# Patient Record
Sex: FEMALE | Race: BLACK OR AFRICAN AMERICAN | NOT HISPANIC OR LATINO | Employment: OTHER | URBAN - METROPOLITAN AREA
[De-identification: names, ages, dates, MRNs, and addresses within clinical notes are randomized per-mention and may not be internally consistent; named-entity substitution may affect disease eponyms.]

---

## 2019-11-16 ENCOUNTER — HOSPITAL ENCOUNTER (EMERGENCY)
Facility: HOSPITAL | Age: 59
Discharge: HOME | End: 2019-11-16
Attending: EMERGENCY MEDICINE
Payer: MEDICARE

## 2019-11-16 VITALS
OXYGEN SATURATION: 99 % | WEIGHT: 235 LBS | DIASTOLIC BLOOD PRESSURE: 86 MMHG | HEART RATE: 95 BPM | RESPIRATION RATE: 16 BRPM | SYSTOLIC BLOOD PRESSURE: 156 MMHG | TEMPERATURE: 97 F

## 2019-11-16 DIAGNOSIS — K92.2 LOWER GI BLEED: Primary | ICD-10-CM

## 2019-11-16 DIAGNOSIS — K64.9 HEMORRHOIDS, UNSPECIFIED HEMORRHOID TYPE: ICD-10-CM

## 2019-11-16 LAB
ALBUMIN SERPL-MCNC: 4.2 G/DL (ref 3.3–5.5)
ALP SERPL-CCNC: 100 U/L (ref 42–141)
BILIRUB SERPL-MCNC: 0.4 MG/DL (ref 0.2–1.6)
BUN SERPL-MCNC: 12 MG/DL (ref 7–22)
CALCIUM SERPL-MCNC: 9.9 MG/DL (ref 8–10.3)
CHLORIDE SERPL-SCNC: 105 MMOL/L (ref 98–108)
CREAT SERPL-MCNC: 0.8 MG/DL (ref 0.6–1.2)
GLUCOSE SERPL-MCNC: 102 MG/DL (ref 73–118)
POC ALT (SGPT): 21 U/L (ref 10–47)
POC AST (SGOT): 31 U/L (ref 11–38)
POC PTINR: 1.1 (ref 0.9–1.2)
POC PTWBT: 12.8 SEC (ref 9.7–14.3)
POC TCO2: 28 MMOL/L (ref 18–33)
POTASSIUM BLD-SCNC: 3.7 MMOL/L (ref 3.6–5.1)
PROTEIN, POC: 8.1 G/DL (ref 6.4–8.1)
SAMPLE: NORMAL
SODIUM BLD-SCNC: 144 MMOL/L (ref 128–145)

## 2019-11-16 PROCEDURE — 25500020 PHARM REV CODE 255: Mod: ER | Performed by: EMERGENCY MEDICINE

## 2019-11-16 PROCEDURE — 96374 THER/PROPH/DIAG INJ IV PUSH: CPT | Mod: ER,59

## 2019-11-16 PROCEDURE — 63600175 PHARM REV CODE 636 W HCPCS: Mod: ER | Performed by: EMERGENCY MEDICINE

## 2019-11-16 PROCEDURE — 85025 COMPLETE CBC W/AUTO DIFF WBC: CPT | Mod: ER

## 2019-11-16 PROCEDURE — 25000003 PHARM REV CODE 250: Mod: ER | Performed by: EMERGENCY MEDICINE

## 2019-11-16 PROCEDURE — 80053 COMPREHEN METABOLIC PANEL: CPT | Mod: ER

## 2019-11-16 PROCEDURE — 96375 TX/PRO/DX INJ NEW DRUG ADDON: CPT | Mod: ER

## 2019-11-16 PROCEDURE — 85610 PROTHROMBIN TIME: CPT | Mod: ER

## 2019-11-16 PROCEDURE — 99284 EMERGENCY DEPT VISIT MOD MDM: CPT | Mod: 25,ER

## 2019-11-16 RX ORDER — ONDANSETRON 2 MG/ML
4 INJECTION INTRAMUSCULAR; INTRAVENOUS
Status: COMPLETED | OUTPATIENT
Start: 2019-11-16 | End: 2019-11-16

## 2019-11-16 RX ORDER — CIPROFLOXACIN 500 MG/1
500 TABLET ORAL 2 TIMES DAILY
Qty: 20 TABLET | Refills: 0 | Status: SHIPPED | OUTPATIENT
Start: 2019-11-16 | End: 2019-11-26

## 2019-11-16 RX ORDER — METRONIDAZOLE 500 MG/1
500 TABLET ORAL
Status: COMPLETED | OUTPATIENT
Start: 2019-11-16 | End: 2019-11-16

## 2019-11-16 RX ORDER — CIPROFLOXACIN 500 MG/1
500 TABLET ORAL
Status: COMPLETED | OUTPATIENT
Start: 2019-11-16 | End: 2019-11-16

## 2019-11-16 RX ORDER — HYDROMORPHONE HYDROCHLORIDE 2 MG/ML
0.5 INJECTION, SOLUTION INTRAMUSCULAR; INTRAVENOUS; SUBCUTANEOUS
Status: COMPLETED | OUTPATIENT
Start: 2019-11-16 | End: 2019-11-16

## 2019-11-16 RX ORDER — METRONIDAZOLE 500 MG/1
500 TABLET ORAL EVERY 12 HOURS
Qty: 14 TABLET | Refills: 0 | Status: SHIPPED | OUTPATIENT
Start: 2019-11-16 | End: 2019-11-26

## 2019-11-16 RX ADMIN — IOHEXOL 100 ML: 350 INJECTION, SOLUTION INTRAVENOUS at 07:11

## 2019-11-16 RX ADMIN — ONDANSETRON 4 MG: 2 INJECTION INTRAMUSCULAR; INTRAVENOUS at 06:11

## 2019-11-16 RX ADMIN — HYDROMORPHONE HYDROCHLORIDE 0.5 MG: 2 INJECTION, SOLUTION INTRAMUSCULAR; INTRAVENOUS; SUBCUTANEOUS at 06:11

## 2019-11-16 RX ADMIN — METRONIDAZOLE 500 MG: 500 TABLET ORAL at 08:11

## 2019-11-16 RX ADMIN — CIPROFLOXACIN HYDROCHLORIDE 500 MG: 500 TABLET, FILM COATED ORAL at 08:11

## 2019-11-16 NOTE — ED PROVIDER NOTES
"Encounter Date: 2019    SCRIBE #1 NOTE: I, Analisa Turner, am scribing for, and in the presence of,  Dr. Bryant. I have scribed the following portions of the note - Other sections scribed: HPI, ROS, PE.       History     Chief Complaint   Patient presents with    Rectal Bleeding     Pt states," I set down and the toilet was full of dark blood from my bottom. "     Chad Nguyen is a 59 y.o. Female with significant past medical history of diverticulosis and gastric ulcers who presents to the ED complaining of dark red rectal bleeding with back pain and RLQ pain x 3 weeks. Patient describes the bleeding as clots. She is not on blood thinners.    The history is provided by the patient. No  was used.     Review of patient's allergies indicates:   Allergen Reactions    Clindamycin Shortness Of Breath     And face swelling    Lisinopril Anaphylaxis    Promethazine Other (See Comments)     "makes me crazy"     Past Medical History:   Diagnosis Date    Cancer     thyroid    Diabetes mellitus     Hypertension      Past Surgical History:   Procedure Laterality Date     SECTION      CHOLECYSTECTOMY      HYSTERECTOMY      TONSILLECTOMY       History reviewed. No pertinent family history.  Social History     Tobacco Use    Smoking status: Never Smoker    Smokeless tobacco: Never Used   Substance Use Topics    Alcohol use: Not on file    Drug use: Not on file     Review of Systems   Constitutional: Negative.  Negative for fever.   HENT: Negative.  Negative for sore throat.    Eyes: Negative.    Respiratory: Negative.  Negative for shortness of breath.    Cardiovascular: Negative.  Negative for chest pain.   Gastrointestinal: Positive for abdominal pain and anal bleeding. Negative for nausea and vomiting.   Endocrine: Negative.    Genitourinary: Negative.  Negative for dysuria.   Musculoskeletal: Positive for back pain. Negative for myalgias.   Skin: Negative.  Negative for rash. "   Allergic/Immunologic: Negative.    Neurological: Negative.  Negative for headaches.   Hematological: Negative.  Negative for adenopathy. Does not bruise/bleed easily.   Psychiatric/Behavioral: Negative.  Negative for behavioral problems.   All other systems reviewed and are negative.      Physical Exam     Initial Vitals [11/16/19 1741]   BP Pulse Resp Temp SpO2   (!) 171/101 104 16 99 °F (37.2 °C) 100 %      MAP       --         Physical Exam    Nursing note and vitals reviewed.  Constitutional: She appears well-developed and well-nourished.   HENT:   Head: Normocephalic and atraumatic.   Right Ear: External ear normal.   Left Ear: External ear normal.   Nose: Nose normal.   Eyes: Conjunctivae are normal.   Neck: Normal range of motion. Neck supple.   Cardiovascular: Normal rate and intact distal pulses.   Pulmonary/Chest: Effort normal. No respiratory distress.   Abdominal: Soft. There is no tenderness.   Genitourinary: Rectal exam shows external hemorrhoid.   Genitourinary Comments: Non-thrombosed bleeding hemorrhoids protruding from rectum.   Musculoskeletal: Normal range of motion.   Neurological: She is alert and oriented to person, place, and time.   Skin: Skin is warm and dry. Capillary refill takes less than 2 seconds.   Psychiatric: She has a normal mood and affect. Her behavior is normal.         ED Course   Procedures  Labs Reviewed   POCT CBC   POCT CMP   POCT PROTIME-INR   ISTAT PROCEDURE   POCT CMP          Imaging Results          CT Abdomen Pelvis With Contrast (Final result)  Result time 11/16/19 19:09:06    Final result by Trevon Manzo MD (11/16/19 19:09:06)                 Impression:      1. Findings suggesting hepatic steatosis, correlation with LFTs recommended.  2. Colonic diverticulosis without findings to suggest diverticulitis.  3. Subcentimeter low attenuating lesions within the kidneys, too small for characterization.  Nonemergent ultrasound could be performed as warranted to  confirm cystic nature.  4. No discrete CT abnormality to correlate with left lower quadrant pain, no findings to suggest left lower quadrant diverticulitis as clinically questioned.  5. Additional findings above.      Electronically signed by: Trevon Manzo MD  Date:    11/16/2019  Time:    19:09             Narrative:    EXAMINATION:  CT ABDOMEN PELVIS WITH CONTRAST    CLINICAL HISTORY:  LLQ pain, suspect diverticulitis;    TECHNIQUE:  Low dose axial images, sagittal and coronal reformations were obtained from the lung bases to the pubic symphysis following the IV administration of 100 mL of Omnipaque 350 .  Oral contrast was not given.    COMPARISON:  None.    FINDINGS:  Images of the lower thorax are remarkable for mild bilateral dependent atelectasis.    The liver is hypoattenuating, suggesting steatosis, correlation with LFTs recommended.  The spleen, pancreas and adrenal glands are grossly unremarkable.  The gallbladder is surgically absent.  There is no biliary dilation or ascites.  The pancreatic duct is not dilated.  The portal vein, splenic vein, SMV, celiac axis and SMA all are grossly patent.  No significant abdominal lymphadenopathy.    The kidneys enhance symmetrically without hydronephrosis or nephrolithiasis.  There are a few scattered subcentimeter low attenuating lesions arising from the kidneys, too small for characterization.  There is a subcentimeter high attenuating lesion arising from the interpolar region of the right kidney, also too small for characterization.  The bilateral ureters are unremarkable without calculi seen.  The urinary bladder is unremarkable the uterus is absent the adnexa is unremarkable.    There are a few scattered colonic diverticula without surrounding inflammation to suggest diverticulitis.  The terminal ileum and appendix are unremarkable.  The small bowel is grossly unremarkable.  There are a few scattered shotty periaortic and paracaval lymph nodes.  No focal  "organized pelvic fluid collection.    Degenerative changes are noted of the spine and sacroiliac joints.  No significant inguinal lymphadenopathy.                                 Medical Decision Making:   History:   Old Medical Records: I decided to obtain old medical records.  Clinical Tests:   Lab Tests: Ordered and Reviewed  ED Management:  This patient's care was turned over to Dr. Mi Velasco at 5:00 p.m. pending CT scan.            Scribe Attestation:   Scribe #1: I performed the above scribed service and the documentation accurately describes the services I performed. I attest to the accuracy of the note.    Transition/[place] of care note:  I have communicated the patient's history and progression in the ED with Dr. Bryant  Brief H&P: Patient is a 59 y.o. female who presented to the ED with Rectal Bleeding (Pt states," I set down and the toilet was full of dark blood from my bottom. ")    Significant history and PE findings: rectal bleeding  DDx: diverticulitis, LGIB, anemia, coagulopathy, others  Consults/Referrals: NA   Significant lab and diagnostic findings: see below  Pending studies and consultations: CTAP  Disposition:  Will continue to monitor while final disposition is pending and manage patient expectations for the duration of stay in ED.  Mi Velasco MD, Emergency Medicine Staff 7:02 PM 11/16/2019          Labs Reviewed  Admission on 11/16/2019, Discharged on 11/16/2019   Component Date Value Ref Range Status    POC PTWBT 11/16/2019 12.8  9.7 - 14.3 sec Final    POC PTINR 11/16/2019 1.1  0.9 - 1.2 Final    Sample 11/16/2019 UNK   Final    Albumin, POC 11/16/2019 4.2  3.3 - 5.5 g/dL Final    Alkaline Phosphatase, POC 11/16/2019 100  42 - 141 U/L Final    ALT (SGPT), POC 11/16/2019 21  10 - 47 U/L Final    AST (SGOT), POC 11/16/2019 31  11 - 38 U/L Final    POC BUN 11/16/2019 12  7 - 22 mg/dL Final    Calcium, POC 11/16/2019 9.9  8 - 10.3 mg/dL Final    POC Chloride " 11/16/2019 105  98 - 108 mmol/L Final    POC Creatinine 11/16/2019 0.8  0.6 - 1.2 mg/dL Final    POC Glucose 11/16/2019 102  73 - 118 mg/dL Final    POC Potassium 11/16/2019 3.7  3.6 - 5.1 mmol/L Final    POC Sodium 11/16/2019 144  128 - 145 mmol/L Final    Bilirubin 11/16/2019 0.4  0.2 - 1.6 mg/dL Final    POC TCO2 11/16/2019 28  18 - 33 mmol/L Final    Protein 11/16/2019 8.1  6.4 - 8.1 g/dL Final        Imaging Reviewed    Imaging Results          CT Abdomen Pelvis With Contrast (Final result)  Result time 11/16/19 19:09:06    Final result by Trevon Manzo MD (11/16/19 19:09:06)                 Impression:      1. Findings suggesting hepatic steatosis, correlation with LFTs recommended.  2. Colonic diverticulosis without findings to suggest diverticulitis.  3. Subcentimeter low attenuating lesions within the kidneys, too small for characterization.  Nonemergent ultrasound could be performed as warranted to confirm cystic nature.  4. No discrete CT abnormality to correlate with left lower quadrant pain, no findings to suggest left lower quadrant diverticulitis as clinically questioned.  5. Additional findings above.      Electronically signed by: Trevon Manzo MD  Date:    11/16/2019  Time:    19:09             Narrative:    EXAMINATION:  CT ABDOMEN PELVIS WITH CONTRAST    CLINICAL HISTORY:  LLQ pain, suspect diverticulitis;    TECHNIQUE:  Low dose axial images, sagittal and coronal reformations were obtained from the lung bases to the pubic symphysis following the IV administration of 100 mL of Omnipaque 350 .  Oral contrast was not given.    COMPARISON:  None.    FINDINGS:  Images of the lower thorax are remarkable for mild bilateral dependent atelectasis.    The liver is hypoattenuating, suggesting steatosis, correlation with LFTs recommended.  The spleen, pancreas and adrenal glands are grossly unremarkable.  The gallbladder is surgically absent.  There is no biliary dilation or ascites.  The  pancreatic duct is not dilated.  The portal vein, splenic vein, SMV, celiac axis and SMA all are grossly patent.  No significant abdominal lymphadenopathy.    The kidneys enhance symmetrically without hydronephrosis or nephrolithiasis.  There are a few scattered subcentimeter low attenuating lesions arising from the kidneys, too small for characterization.  There is a subcentimeter high attenuating lesion arising from the interpolar region of the right kidney, also too small for characterization.  The bilateral ureters are unremarkable without calculi seen.  The urinary bladder is unremarkable the uterus is absent the adnexa is unremarkable.    There are a few scattered colonic diverticula without surrounding inflammation to suggest diverticulitis.  The terminal ileum and appendix are unremarkable.  The small bowel is grossly unremarkable.  There are a few scattered shotty periaortic and paracaval lymph nodes.  No focal organized pelvic fluid collection.    Degenerative changes are noted of the spine and sacroiliac joints.  No significant inguinal lymphadenopathy.                                Medications given in ED    Medications   hydromorphone (PF) injection 0.5 mg (0.5 mg Intravenous Given 11/16/19 1843)   ondansetron injection 4 mg (4 mg Intravenous Given 11/16/19 1843)   iohexol (OMNIPAQUE 350) injection 100 mL (100 mLs Intravenous Given 11/16/19 1900)   ciprofloxacin HCl tablet 500 mg (500 mg Oral Given 11/16/19 2031)   metroNIDAZOLE tablet 500 mg (500 mg Oral Given 11/16/19 2031)       This document was produced by a scribe under my direction and in my presence. I agree with the content of the note and have made any necessary edits.     Mi Velasco MD         Note was created using voice recognition software. Note may have occasional typographical errors that may not have been identified and edited despite good hugo initial review prior to signing.          ED Course as of Nov 17 0403   Sat Nov 16,  2019 2009 No recurrent bleeding during ED course.     [DL]      ED Course User Index  [DL] Mi Velasco MD           Discharge Medications     Discharge Medication List as of 11/16/2019  8:35 PM      START taking these medications    Details   ciprofloxacin HCl (CIPRO) 500 MG tablet Take 1 tablet (500 mg total) by mouth 2 (two) times daily. for 10 days, Starting Sat 11/16/2019, Until Tue 11/26/2019, Normal      hydrocortisone-pramoxine (PROCTOFOAM-HS) rectal foam Place 1 applicator rectally 2 (two) times daily., Starting Sat 11/16/2019, Normal      metroNIDAZOLE (FLAGYL) 500 MG tablet Take 1 tablet (500 mg total) by mouth every 12 (twelve) hours. DO NOT DRINK ALCOHOL WHILE TAKING THIS MEDICATION for 10 days, Starting Sat 11/16/2019, Until Tue 11/26/2019, Normal                   Patient discharged to home in stable condition with instructions to:   1. Please take all meds as prescribed.  2. Follow-up with your primary care doctor   3. Return precautions discussed and patient and/or family/caretaker understands to return to the emergency room for any concerns including worsening of your current symptoms, fever, chills, night sweats, worsening pain, chest pain, shortness of breath, nausea, vomiting, diarrhea, bleeding, headache, difficulty talking, visual disturbances, weakness, numbness or any other acute concerns          Clinical Impression:     1. Lower GI bleed    2. Hemorrhoids, unspecified hemorrhoid type            Disposition:   Disposition: Discharged  Condition: Stable                     Mi Velasco MD  11/17/19 0404

## 2020-02-22 ENCOUNTER — HOSPITAL ENCOUNTER (OUTPATIENT)
Facility: HOSPITAL | Age: 60
Discharge: HOME | End: 2020-02-23
Attending: EMERGENCY MEDICINE | Admitting: HOSPITALIST
Payer: MEDICARE

## 2020-02-22 DIAGNOSIS — R06.02 SOB (SHORTNESS OF BREATH): ICD-10-CM

## 2020-02-22 DIAGNOSIS — R07.9 CHEST PAIN: ICD-10-CM

## 2020-02-22 PROBLEM — Z79.4 TYPE 2 DIABETES MELLITUS, WITH LONG-TERM CURRENT USE OF INSULIN: Chronic | Status: ACTIVE | Noted: 2020-02-22

## 2020-02-22 PROBLEM — E11.9 TYPE 2 DIABETES MELLITUS, WITHOUT LONG-TERM CURRENT USE OF INSULIN: Chronic | Status: ACTIVE | Noted: 2020-02-22

## 2020-02-22 PROBLEM — I10 ESSENTIAL HYPERTENSION: Chronic | Status: ACTIVE | Noted: 2020-02-22

## 2020-02-22 PROBLEM — E78.2 MIXED HYPERLIPIDEMIA: Chronic | Status: ACTIVE | Noted: 2020-02-22

## 2020-02-22 LAB
ALBUMIN SERPL-MCNC: 4.6 G/DL (ref 3.3–5.5)
ALLENS TEST: ABNORMAL
ALP SERPL-CCNC: 106 U/L (ref 42–141)
BILIRUB SERPL-MCNC: 0.5 MG/DL (ref 0.2–1.6)
BILIRUBIN, POC UA: NEGATIVE
BLOOD, POC UA: ABNORMAL
BUN SERPL-MCNC: 12 MG/DL (ref 7–22)
CALCIUM SERPL-MCNC: 10.7 MG/DL (ref 8–10.3)
CHLORIDE SERPL-SCNC: 99 MMOL/L (ref 98–108)
CLARITY, POC UA: CLEAR
COLOR, POC UA: YELLOW
CREAT SERPL-MCNC: 0.7 MG/DL (ref 0.6–1.2)
CTP QC/QA: YES
GLUCOSE SERPL-MCNC: 87 MG/DL (ref 73–118)
GLUCOSE, POC UA: NEGATIVE
HCO3 UR-SCNC: 28.2 MMOL/L (ref 24–28)
KETONES, POC UA: NEGATIVE
LDH SERPL L TO P-CCNC: 3.22 MMOL/L (ref 0.5–2.2)
LEUKOCYTE EST, POC UA: NEGATIVE
NITRITE, POC UA: NEGATIVE
PCO2 BLDA: 40 MMHG (ref 35–45)
PH SMN: 7.46 [PH] (ref 7.35–7.45)
PH UR STRIP: 7 [PH]
PO2 BLDA: 36 MMHG (ref 40–60)
POC ALT (SGPT): 16 U/L (ref 10–47)
POC AST (SGOT): 26 U/L (ref 11–38)
POC B-TYPE NATRIURETIC PEPTIDE: 11.3 PG/ML (ref 0–100)
POC BE: 4 MMOL/L
POC CARDIAC TROPONIN I: 0 NG/ML
POC MOLECULAR INFLUENZA A AGN: NEGATIVE
POC MOLECULAR INFLUENZA B AGN: NEGATIVE
POC PTINR: 1 (ref 0.9–1.2)
POC PTWBT: 12.1 SEC (ref 9.7–14.3)
POC SATURATED O2: 72 % (ref 95–100)
POC TCO2: 28 MMOL/L (ref 18–33)
POC TCO2: 29 MMOL/L (ref 24–29)
POCT GLUCOSE: 77 MG/DL (ref 70–110)
POTASSIUM BLD-SCNC: 3.6 MMOL/L (ref 3.6–5.1)
PROTEIN, POC UA: ABNORMAL
PROTEIN, POC: 8.1 G/DL (ref 6.4–8.1)
SAMPLE: ABNORMAL
SAMPLE: NORMAL
SAMPLE: NORMAL
SITE: ABNORMAL
SODIUM BLD-SCNC: 140 MMOL/L (ref 128–145)
SPECIFIC GRAVITY, POC UA: 1.02
TROPONIN I SERPL DL<=0.01 NG/ML-MCNC: <0.006 NG/ML (ref 0–0.03)
UROBILINOGEN, POC UA: 0.2 E.U./DL

## 2020-02-22 PROCEDURE — G0378 HOSPITAL OBSERVATION PER HR: HCPCS

## 2020-02-22 PROCEDURE — 80053 COMPREHEN METABOLIC PANEL: CPT | Mod: ER

## 2020-02-22 PROCEDURE — 96360 HYDRATION IV INFUSION INIT: CPT

## 2020-02-22 PROCEDURE — 36415 COLL VENOUS BLD VENIPUNCTURE: CPT

## 2020-02-22 PROCEDURE — 63700000 PHARM REV CODE 250 ALT 637 W/O HCPCS: Mod: ER | Performed by: EMERGENCY MEDICINE

## 2020-02-22 PROCEDURE — 84484 ASSAY OF TROPONIN QUANT: CPT | Mod: ER

## 2020-02-22 PROCEDURE — 84484 ASSAY OF TROPONIN QUANT: CPT

## 2020-02-22 PROCEDURE — 99291 CRITICAL CARE FIRST HOUR: CPT | Mod: 25,ER

## 2020-02-22 PROCEDURE — 82803 BLOOD GASES ANY COMBINATION: CPT | Mod: ER

## 2020-02-22 PROCEDURE — 83036 HEMOGLOBIN GLYCOSYLATED A1C: CPT

## 2020-02-22 PROCEDURE — 93005 ELECTROCARDIOGRAM TRACING: CPT | Mod: ER

## 2020-02-22 PROCEDURE — 25000003 PHARM REV CODE 250: Performed by: HOSPITALIST

## 2020-02-22 PROCEDURE — 87502 INFLUENZA DNA AMP PROBE: CPT | Mod: ER

## 2020-02-22 PROCEDURE — 81003 URINALYSIS AUTO W/O SCOPE: CPT | Mod: ER

## 2020-02-22 PROCEDURE — 93010 ELECTROCARDIOGRAM REPORT: CPT | Mod: ,,, | Performed by: INTERNAL MEDICINE

## 2020-02-22 PROCEDURE — 96372 THER/PROPH/DIAG INJ SC/IM: CPT | Mod: 59

## 2020-02-22 PROCEDURE — 93010 EKG 12-LEAD: ICD-10-PCS | Mod: ,,, | Performed by: INTERNAL MEDICINE

## 2020-02-22 PROCEDURE — 83880 ASSAY OF NATRIURETIC PEPTIDE: CPT | Mod: ER

## 2020-02-22 PROCEDURE — 85025 COMPLETE CBC W/AUTO DIFF WBC: CPT | Mod: ER

## 2020-02-22 PROCEDURE — 25500020 PHARM REV CODE 255: Mod: ER | Performed by: EMERGENCY MEDICINE

## 2020-02-22 PROCEDURE — 63600175 PHARM REV CODE 636 W HCPCS: Mod: ER | Performed by: EMERGENCY MEDICINE

## 2020-02-22 PROCEDURE — 25000003 PHARM REV CODE 250: Mod: ER | Performed by: EMERGENCY MEDICINE

## 2020-02-22 PROCEDURE — 85610 PROTHROMBIN TIME: CPT | Mod: ER

## 2020-02-22 RX ORDER — POTASSIUM CHLORIDE 20 MEQ/1
40 TABLET, EXTENDED RELEASE ORAL
Status: COMPLETED | OUTPATIENT
Start: 2020-02-22 | End: 2020-02-22

## 2020-02-22 RX ORDER — METOPROLOL SUCCINATE 25 MG/1
25 TABLET, EXTENDED RELEASE ORAL DAILY
Status: DISCONTINUED | OUTPATIENT
Start: 2020-02-23 | End: 2020-02-23 | Stop reason: HOSPADM

## 2020-02-22 RX ORDER — ATORVASTATIN CALCIUM 10 MG/1
20 TABLET, FILM COATED ORAL DAILY
Status: DISCONTINUED | OUTPATIENT
Start: 2020-02-23 | End: 2020-02-23 | Stop reason: HOSPADM

## 2020-02-22 RX ORDER — ACETAMINOPHEN 325 MG/1
650 TABLET ORAL EVERY 6 HOURS PRN
Status: DISCONTINUED | OUTPATIENT
Start: 2020-02-22 | End: 2020-02-23 | Stop reason: HOSPADM

## 2020-02-22 RX ORDER — AMLODIPINE BESYLATE 5 MG/1
5 TABLET ORAL DAILY
COMMUNITY

## 2020-02-22 RX ORDER — NITROGLYCERIN 0.4 MG/1
0.4 TABLET SUBLINGUAL EVERY 5 MIN PRN
Status: COMPLETED | OUTPATIENT
Start: 2020-02-22 | End: 2020-02-22

## 2020-02-22 RX ORDER — AMLODIPINE BESYLATE 5 MG/1
5 TABLET ORAL DAILY
Status: DISCONTINUED | OUTPATIENT
Start: 2020-02-23 | End: 2020-02-23 | Stop reason: HOSPADM

## 2020-02-22 RX ORDER — ASPIRIN 325 MG
325 TABLET ORAL
Status: DISCONTINUED | OUTPATIENT
Start: 2020-02-22 | End: 2020-02-22

## 2020-02-22 RX ORDER — HYDROCHLOROTHIAZIDE 25 MG/1
25 TABLET ORAL DAILY
Status: DISCONTINUED | OUTPATIENT
Start: 2020-02-23 | End: 2020-02-23 | Stop reason: HOSPADM

## 2020-02-22 RX ORDER — METOPROLOL SUCCINATE 25 MG/1
25 TABLET, EXTENDED RELEASE ORAL DAILY
COMMUNITY

## 2020-02-22 RX ORDER — LEVOTHYROXINE SODIUM 125 UG/1
125 TABLET ORAL
COMMUNITY

## 2020-02-22 RX ORDER — ENOXAPARIN SODIUM 100 MG/ML
1 INJECTION SUBCUTANEOUS
Status: COMPLETED | OUTPATIENT
Start: 2020-02-22 | End: 2020-02-22

## 2020-02-22 RX ORDER — ONDANSETRON 8 MG/1
8 TABLET, ORALLY DISINTEGRATING ORAL EVERY 8 HOURS PRN
Status: DISCONTINUED | OUTPATIENT
Start: 2020-02-22 | End: 2020-02-23 | Stop reason: HOSPADM

## 2020-02-22 RX ORDER — ASPIRIN 81 MG/1
81 TABLET ORAL DAILY
Status: DISCONTINUED | OUTPATIENT
Start: 2020-02-23 | End: 2020-02-23 | Stop reason: HOSPADM

## 2020-02-22 RX ORDER — FAMOTIDINE 20 MG/1
20 TABLET, FILM COATED ORAL 2 TIMES DAILY
Status: DISCONTINUED | OUTPATIENT
Start: 2020-02-22 | End: 2020-02-22

## 2020-02-22 RX ORDER — IBUPROFEN 200 MG
24 TABLET ORAL
Status: DISCONTINUED | OUTPATIENT
Start: 2020-02-22 | End: 2020-02-23 | Stop reason: HOSPADM

## 2020-02-22 RX ORDER — TIZANIDINE 4 MG/1
4 TABLET ORAL EVERY 6 HOURS PRN
COMMUNITY

## 2020-02-22 RX ORDER — GABAPENTIN 300 MG/1
300 CAPSULE ORAL 3 TIMES DAILY
Status: DISCONTINUED | OUTPATIENT
Start: 2020-02-23 | End: 2020-02-22

## 2020-02-22 RX ORDER — ATORVASTATIN CALCIUM 20 MG/1
20 TABLET, FILM COATED ORAL DAILY
COMMUNITY

## 2020-02-22 RX ORDER — FAMOTIDINE 20 MG/1
20 TABLET, FILM COATED ORAL 2 TIMES DAILY
COMMUNITY

## 2020-02-22 RX ORDER — IBUPROFEN 200 MG
16 TABLET ORAL
Status: DISCONTINUED | OUTPATIENT
Start: 2020-02-22 | End: 2020-02-23 | Stop reason: HOSPADM

## 2020-02-22 RX ORDER — ACETAMINOPHEN 325 MG/1
650 TABLET ORAL EVERY 8 HOURS PRN
Status: DISCONTINUED | OUTPATIENT
Start: 2020-02-22 | End: 2020-02-22

## 2020-02-22 RX ORDER — INSULIN GLARGINE 100 [IU]/ML
30 INJECTION, SOLUTION SUBCUTANEOUS DAILY
COMMUNITY

## 2020-02-22 RX ORDER — LEVOTHYROXINE SODIUM 125 UG/1
125 TABLET ORAL
Status: DISCONTINUED | OUTPATIENT
Start: 2020-02-23 | End: 2020-02-23 | Stop reason: HOSPADM

## 2020-02-22 RX ORDER — SODIUM CHLORIDE 0.9 % (FLUSH) 0.9 %
10 SYRINGE (ML) INJECTION
Status: DISCONTINUED | OUTPATIENT
Start: 2020-02-22 | End: 2020-02-23 | Stop reason: HOSPADM

## 2020-02-22 RX ORDER — GLUCAGON 1 MG
1 KIT INJECTION
Status: DISCONTINUED | OUTPATIENT
Start: 2020-02-22 | End: 2020-02-23 | Stop reason: HOSPADM

## 2020-02-22 RX ORDER — HYDROCORTISONE 1 %
CREAM (GRAM) TOPICAL
COMMUNITY

## 2020-02-22 RX ORDER — PANTOPRAZOLE SODIUM 40 MG/1
40 TABLET, DELAYED RELEASE ORAL DAILY
Status: DISCONTINUED | OUTPATIENT
Start: 2020-02-22 | End: 2020-02-23 | Stop reason: HOSPADM

## 2020-02-22 RX ORDER — METFORMIN HYDROCHLORIDE 500 MG/1
500 TABLET ORAL 2 TIMES DAILY WITH MEALS
COMMUNITY

## 2020-02-22 RX ORDER — HYDROCHLOROTHIAZIDE 25 MG/1
25 TABLET ORAL DAILY
COMMUNITY

## 2020-02-22 RX ORDER — INSULIN ASPART 100 [IU]/ML
1-10 INJECTION, SOLUTION INTRAVENOUS; SUBCUTANEOUS
Status: DISCONTINUED | OUTPATIENT
Start: 2020-02-22 | End: 2020-02-23 | Stop reason: HOSPADM

## 2020-02-22 RX ORDER — ASPIRIN 325 MG
325 TABLET ORAL
Status: COMPLETED | OUTPATIENT
Start: 2020-02-22 | End: 2020-02-22

## 2020-02-22 RX ORDER — GLIMEPIRIDE 2 MG/1
2 TABLET ORAL
COMMUNITY

## 2020-02-22 RX ORDER — TRAMADOL HYDROCHLORIDE 50 MG/1
50 TABLET ORAL ONCE
Status: COMPLETED | OUTPATIENT
Start: 2020-02-22 | End: 2020-02-22

## 2020-02-22 RX ORDER — GABAPENTIN 300 MG/1
300 CAPSULE ORAL 3 TIMES DAILY
COMMUNITY

## 2020-02-22 RX ORDER — IPRATROPIUM BROMIDE 42 UG/1
2 SPRAY, METERED NASAL 2 TIMES DAILY PRN
COMMUNITY

## 2020-02-22 RX ORDER — ENOXAPARIN SODIUM 100 MG/ML
40 INJECTION SUBCUTANEOUS EVERY 24 HOURS
Status: DISCONTINUED | OUTPATIENT
Start: 2020-02-23 | End: 2020-02-23 | Stop reason: HOSPADM

## 2020-02-22 RX ORDER — GABAPENTIN 300 MG/1
300 CAPSULE ORAL 3 TIMES DAILY
Status: DISCONTINUED | OUTPATIENT
Start: 2020-02-22 | End: 2020-02-23 | Stop reason: HOSPADM

## 2020-02-22 RX ADMIN — PANTOPRAZOLE SODIUM 40 MG: 40 TABLET, DELAYED RELEASE ORAL at 10:02

## 2020-02-22 RX ADMIN — SODIUM CHLORIDE 1000 ML: 0.9 INJECTION, SOLUTION INTRAVENOUS at 04:02

## 2020-02-22 RX ADMIN — IOHEXOL 100 ML: 350 INJECTION, SOLUTION INTRAVENOUS at 06:02

## 2020-02-22 RX ADMIN — NITROGLYCERIN 0.4 MG: 0.4 TABLET SUBLINGUAL at 04:02

## 2020-02-22 RX ADMIN — ENOXAPARIN SODIUM 110 MG: 100 INJECTION SUBCUTANEOUS at 06:02

## 2020-02-22 RX ADMIN — POTASSIUM CHLORIDE 40 MEQ: 1500 TABLET, EXTENDED RELEASE ORAL at 05:02

## 2020-02-22 RX ADMIN — ACETAMINOPHEN 650 MG: 325 TABLET ORAL at 10:02

## 2020-02-22 RX ADMIN — NITROGLYCERIN 1 INCH: 20 OINTMENT TOPICAL at 06:02

## 2020-02-22 RX ADMIN — ASPIRIN 325 MG ORAL TABLET 325 MG: 325 PILL ORAL at 04:02

## 2020-02-22 RX ADMIN — GABAPENTIN 300 MG: 300 CAPSULE ORAL at 10:02

## 2020-02-22 RX ADMIN — TRAMADOL HYDROCHLORIDE 50 MG: 50 TABLET ORAL at 04:02

## 2020-02-22 NOTE — HPI
59 y.o. female with hypertension, hyperlipidemia dm 2, and morbid obesity presents with a complaint of shortness of breath that has been intermittent for the past week.  Associated with midsternal chest pain that radiates to the left chest that began acutely earlier today.  Attempted self treatment with home inhaler without relief.  Also complains of left lower leg pain. Denies fever, chills, cough, palpitations, orthopnea, PND, dizziness, syncope, nausea, vomiting, diarrhea, abdominal pain, bloody or black stool, dysuria, frequency, urgency.  Recent laminectomy 01/22/2020.  Saw cardiologist Dr. Sosa in early January 2020 for preop clearance.  EKG at that time was borderline abnormal with nonspecific ST-T abnormalities.  Underwent echocardiogram and nuclear stress test 01/13/2020.  She has not yet followed up to receive her results.  In the ED today, EKG without evidence of acute ischemia and appears similar to the description and Dr. Sosa's note.  Initial troponin negative.  Routine labs, urinalysis, and chest x-ray also unremarkable for any acute abnormality.  CTA chest is pending to rule out PE.  Placed in observation for ACS rule out.

## 2020-02-22 NOTE — ED PROVIDER NOTES
"Encounter Date: 2020    SCRIBE #1 NOTE: I, Ashutosh Myrick, am scribing for, and in the presence of,  Dr. Jensen. I have scribed the following portions of the note - the EKG reading. Other sections scribed: HPI, ROS, PE.       History     Chief Complaint   Patient presents with    Shortness of Breath     INTERMITTENT SOB X 1 WEEK WORSENING WITHIN THE LAST HOUR; STABBING MIDSTERNAL CHEST PAIN RADIATING TO LEFT CHEST AT A 6/10 PAIN;      Chad Nguyen is a 59 y.o. female with history of DM, HTN, and cancer who presents to the ED complaining of intermittent SOB since yesterday and the current episode started 1 hour PTA. Patient has used her inhaler 8x in the last hour. Patient complains of stabbing mid-sternal chest pain radiating to the left chest that started today, 4/10 currently and 7/10 at its worse. Patient also complains of left leg pain and losing 11 lbs since surgery on 2020. Patient was seen by her PCP yesterday for a regular checkup and reports a 72 PO2. No history of MI. Patient reports she was diagnosed with an UTI 10 days ago and finished her 7 day BID course of Keflex. Patient is allergic to clindamycin, lisinopril, and promethazine. She does not smoke, drink EtOH, or use street drugs. Patient does not take any blood thinners.    The history is provided by the patient. No  was used.     Review of patient's allergies indicates:   Allergen Reactions    Clindamycin Shortness Of Breath     And face swelling    Lisinopril Anaphylaxis    Promethazine Other (See Comments)     "makes me crazy"     Past Medical History:   Diagnosis Date    Cancer     thyroid    Diabetes mellitus     Hypertension      Past Surgical History:   Procedure Laterality Date     SECTION      CHOLECYSTECTOMY      HYSTERECTOMY      TONSILLECTOMY       No family history on file.  Social History     Tobacco Use    Smoking status: Never Smoker    Smokeless tobacco: Never Used   Substance Use " Topics    Alcohol use: Not on file    Drug use: Not on file     Review of Systems   Constitutional: Negative for fever.   HENT: Negative for rhinorrhea and sore throat.    Eyes: Negative for redness.   Respiratory: Positive for shortness of breath.    Cardiovascular: Positive for chest pain. Negative for leg swelling.   Gastrointestinal: Negative for abdominal pain, diarrhea, nausea and vomiting.   Musculoskeletal: Positive for myalgias (left leg pain). Negative for back pain.   Skin: Negative for rash.   Neurological: Negative for syncope and headaches.   All other systems reviewed and are negative.      Physical Exam     Initial Vitals [02/22/20 1547]   BP Pulse Resp Temp SpO2   (!) 189/103 (!) 116 (!) 24 99.4 °F (37.4 °C) 100 %      MAP       --         Physical Exam    Nursing note and vitals reviewed.  Constitutional: She appears well-developed and well-nourished.   HENT:   Head: Normocephalic and atraumatic.   Right Ear: External ear normal.   Left Ear: External ear normal.   Nose: Nose normal.   Mouth/Throat: Oropharynx is clear and moist.   Eyes: Conjunctivae and EOM are normal. Pupils are equal, round, and reactive to light.   Neck: Normal range of motion and phonation normal. Neck supple.   Cardiovascular: Regular rhythm, S1 normal, S2 normal, normal heart sounds and intact distal pulses. Tachycardia present.  Exam reveals no gallop and no friction rub.    No murmur heard.  Pulmonary/Chest: Effort normal and breath sounds normal. No stridor. Tachypnea noted. No respiratory distress. She has no decreased breath sounds. She has no wheezes. She has no rhonchi. She has no rales. She exhibits no tenderness.   Abdominal: Soft. Bowel sounds are normal. She exhibits no distension. There is no tenderness. There is no rigidity, no rebound and no guarding.   Musculoskeletal: Normal range of motion. She exhibits no edema.        Left lower leg: She exhibits tenderness. She exhibits no swelling.   Tenderness to the  left without swelling.   Neurological: She is alert and oriented to person, place, and time. She has normal strength. No cranial nerve deficit or sensory deficit. GCS score is 15. GCS eye subscore is 4. GCS verbal subscore is 5. GCS motor subscore is 6.   Skin: Skin is warm and dry. Capillary refill takes less than 2 seconds. No rash noted.   Psychiatric: Her behavior is normal. Her mood appears anxious.         ED Course   Critical Care  Date/Time: 2/22/2020 4:28 PM  Performed by: Nelly Jensen DO  Authorized by: Nelly Jensen DO   Direct patient critical care time: 8 minutes  Additional history critical care time: 8 minutes  Ordering / reviewing critical care time: 8 minutes  Documentation critical care time: 8 minutes  Consulting other physicians critical care time: 8 minutes  Consult with family critical care time: 8 minutes  Total critical care time (exclusive of procedural time) : 48 minutes  Critical care was necessary to treat or prevent imminent or life-threatening deterioration of the following conditions: cardiac failure, circulatory failure and respiratory failure.  Critical care was time spent personally by me on the following activities: development of treatment plan with patient or surrogate, discussions with consultants, interpretation of cardiac output measurements, evaluation of patient's response to treatment, examination of patient, obtaining history from patient or surrogate, ordering and performing treatments and interventions, ordering and review of laboratory studies, ordering and review of radiographic studies, pulse oximetry, re-evaluation of patient's condition and review of old charts.        Labs Reviewed   POCT URINALYSIS W/O SCOPE - Abnormal; Notable for the following components:       Result Value    Blood, UA Trace-intact (*)     Protein, UA 1+ (*)     All other components within normal limits   POCT CMP - Abnormal; Notable for the following components:    Calcium, POC 10.7 (*)     All  other components within normal limits   ISTAT PROCEDURE - Abnormal; Notable for the following components:    POC PH 7.455 (*)     POC PO2 36 (*)     POC HCO3 28.2 (*)     POC SATURATED O2 72 (*)     POC Lactate 3.22 (*)     All other components within normal limits   TROPONIN ISTAT   POCT CBC   POCT URINALYSIS W/O SCOPE   POCT INFLUENZA A/B MOLECULAR   POCT CMP   POCT PROTIME-INR   POCT TROPONIN   POCT B-TYPE NATRIURETIC PEPTIDE (BNP)   ISTAT PROCEDURE   POCT B-TYPE NATRIURETIC PEPTIDE (BNP)     EKG Readings: (Independently Interpreted)   No STEMI. Rate of 116. Sinus tachycardia. Normal Axis. Abnormal EKG. QTc at 492. LVH appreciated. No prior EKG for comparison.   Other EKG Interpretations: 18:26  No STEMI. Normal sinus rhythm. Left axis. Rate of 95.  QTc at 464. LVH appreciated. Abnormal EKG. When compared to earlier EKG, rate has decreased by 21 BPM.     ECG Results          EKG 12-lead (In process)  Result time 02/22/20 17:15:07    In process by Interface, Lab In [facility] (02/22/20 17:15:07)                 Narrative:    Test Reason : R06.02,    Vent. Rate : 116 BPM     Atrial Rate : 116 BPM     P-R Int : 144 ms          QRS Dur : 082 ms      QT Int : 354 ms       P-R-T Axes : 060 003 071 degrees     QTc Int : 492 ms    Sinus tachycardia  Minimal voltage criteria for LVH, may be normal variant  Cannot rule out Anterior infarct ,age undetermined  Abnormal ECG  No previous ECGs available    Referred By: [email or web address]   SELF           Confirmed By:                             Imaging Results          CTA Chest Non-Coronary (PE Study) (In process)                X-Ray Chest PA And Lateral (Final result)  Result time 02/22/20 16:19:06    Final result by Trevon Manzo MD (02/22/20 16:19:06)                 Impression:      1. No acute cardiopulmonary process.      Electronically signed by: Trevon Manzo MD  Date:    02/22/2020  Time:    16:19             Narrative:    EXAMINATION:  XR CHEST PA AND  LATERAL    CLINICAL HISTORY:  Chest Pain;    TECHNIQUE:  PA and lateral views of the chest were performed.    COMPARISON:  None    FINDINGS:  The cardiomediastinal silhouette is not enlarged.  There is no pleural effusion.  The trachea is midline.  The lungs are symmetrically expanded bilaterally without evidence of acute parenchymal process. No large focal consolidation seen.  There is no pneumothorax.  The osseous structures are remarkable for degenerative change.  Surgical change overlies the right upper quadrant..                                 Medical Decision Making:   History:   Old Medical Records: I decided to obtain old medical records.  Independently Interpreted Test(s):   I have ordered and independently interpreted EKG Reading(s) - see prior notes  Clinical Tests:   Lab Tests: Ordered and Reviewed  Radiological Study: Ordered and Reviewed  Medical Tests: Ordered and Reviewed  Medical decision making   Chief complaint: chest pain, SOB, and left leg pain for weeks.  Differential diagnosis:  STEMI, NSTEMI, pneumonia, bronchitis, pneumothorax, hyperglycemia, hypoglycemia, and leg pain.  Treatment in the ED Physical Exam,   Patient reports feeling better after treatment in the ER.    Discussed diagnosis, labs, and imaging results with the patient.    All chest pain resolved after nitroglycerin.  Patient is agreeable to transfer & admission at [facility].  Patient with return of chest pain will place nitro to chest wall  I spoke with [facility] Escobar, at 5:02 p.m..   Requesting consultation with hospitalist for services not available at this facility.   Discussed patient's presentation, past medical history, physical exam, labs, radiology results, vital signs, and ED course.  Consultation with nurse practitioner Robel on-call for DR Gorman for transfer and admission at 5:28pm.  At this time patient will be transferred & admitted.  Patient will be transferred via EMS to accepting facility.       At time of transfer patient is awake alert oriented x4 speaking clearly in full sentences and moving all 4 extremities.      Additional MDM:   Heart Score:    History:          Moderately suspicious.  ECG:             Nonspecific repolarisation disturbance  Age:               45-65 years  Risk factors: >= 3 risk factors or history of atherosclerotic disease  Troponin:       Less than or equal to normal limit  Final Score: 5             Scribe Attestation:   Scribe #1: I performed the above scribed service and the documentation accurately describes the services I performed. I attest to the accuracy of the note.     I, Dr. Nelly Jensen, personally performed the services described in this documentation. This document was produced by a scribe under my direction and in my presence. All medical record entries made by the scribe were at my direction and in my presence.  I have reviewed the chart and agree that the record reflects my personal performance and is accurate and complete. Nelly Jensen, .     02/22/2020 5:04 PM                        Clinical Impression:     1. SOB (shortness of breath)    2. Chest pain                                Nelly Jensen DO  02/24/20 0719

## 2020-02-22 NOTE — SUBJECTIVE & OBJECTIVE
"Past Medical History:   Diagnosis Date    Cancer     thyroid    Diabetes mellitus     Hypertension        Past Surgical History:   Procedure Laterality Date     SECTION      CHOLECYSTECTOMY      HYSTERECTOMY      TONSILLECTOMY         Review of patient's allergies indicates:   Allergen Reactions    Clindamycin Shortness Of Breath     And face swelling    Lisinopril Anaphylaxis    Promethazine Other (See Comments)     "makes me crazy"       No current facility-administered medications on file prior to encounter.      Current Outpatient Medications on File Prior to Encounter   Medication Sig    hydrocortisone-pramoxine (PROCTOFOAM-HS) rectal foam Place 1 applicator rectally 2 (two) times daily.     Family History     None        Tobacco Use    Smoking status: Never Smoker    Smokeless tobacco: Never Used   Substance and Sexual Activity    Alcohol use: Not on file    Drug use: Not on file    Sexual activity: Not on file     Review of Systems   Constitutional: Negative for chills, fatigue and fever.   Eyes: Negative for photophobia and visual disturbance.   Respiratory: Positive for shortness of breath. Negative for cough.    Cardiovascular: Positive for chest pain. Negative for palpitations and leg swelling.   Gastrointestinal: Negative for abdominal pain, diarrhea, nausea and vomiting.   Genitourinary: Negative for dysuria, frequency and urgency.   Musculoskeletal: Positive for myalgias.   Skin: Negative for pallor, rash and wound.   Neurological: Negative for light-headedness and headaches.   Psychiatric/Behavioral: Negative for confusion and decreased concentration.     Objective:     Vital Signs (Most Recent):  Temp: 99.4 °F (37.4 °C) (20 1547)  Pulse: 97 (20 170)  Resp: 20 (20 170)  BP: (!) 152/68 (20 1702)  SpO2: 97 % (20) Vital Signs (24h Range):  Temp:  [99.4 °F (37.4 °C)] 99.4 °F (37.4 °C)  Pulse:  [] 97  Resp:  [20-24] 20  SpO2:  [95 %-100 %] " 97 %  BP: (139-189)/() 152/68     Weight: 105.2 kg (232 lb)  Body mass index is 41.1 kg/m².    Physical Exam   Constitutional: She is oriented to person, place, and time. She appears well-developed and well-nourished. No distress.   HENT:   Head: Normocephalic and atraumatic.   Right Ear: External ear normal.   Left Ear: External ear normal.   Nose: Nose normal.   Mouth/Throat: Oropharynx is clear and moist.   Eyes: Pupils are equal, round, and reactive to light. Conjunctivae and EOM are normal.   Neck: Normal range of motion. Neck supple.   Cardiovascular: Normal rate, regular rhythm and intact distal pulses.   Pulmonary/Chest: Effort normal and breath sounds normal. No respiratory distress. She has no wheezes.   Abdominal: Soft. Bowel sounds are normal. She exhibits no distension. There is no tenderness.   No palpable hepatomegaly or splenomegaly    Musculoskeletal: Normal range of motion. She exhibits no edema or tenderness.   Neurological: She is alert and oriented to person, place, and time.   Skin: Skin is warm and dry.   Psychiatric: She has a normal mood and affect. Thought content normal.   Nursing note and vitals reviewed.        CRANIAL NERVES     CN III, IV, VI   Pupils are equal, round, and reactive to light.  Extraocular motions are normal.        Significant Labs: All pertinent labs within the past 24 hours have been reviewed.    Significant Imaging: I have reviewed all pertinent imaging results/findings within the past 24 hours.

## 2020-02-23 VITALS
RESPIRATION RATE: 18 BRPM | WEIGHT: 232 LBS | HEIGHT: 63 IN | SYSTOLIC BLOOD PRESSURE: 166 MMHG | BODY MASS INDEX: 41.11 KG/M2 | TEMPERATURE: 98 F | OXYGEN SATURATION: 97 % | HEART RATE: 86 BPM | DIASTOLIC BLOOD PRESSURE: 85 MMHG

## 2020-02-23 LAB
ALBUMIN SERPL BCP-MCNC: 4.1 G/DL (ref 3.5–5.2)
ALP SERPL-CCNC: 121 U/L (ref 55–135)
ALT SERPL W/O P-5'-P-CCNC: 12 U/L (ref 10–44)
ANION GAP SERPL CALC-SCNC: 12 MMOL/L (ref 8–16)
AST SERPL-CCNC: 13 U/L (ref 10–40)
BASOPHILS # BLD AUTO: 0.03 K/UL (ref 0–0.2)
BASOPHILS NFR BLD: 0.3 % (ref 0–1.9)
BILIRUB SERPL-MCNC: 0.3 MG/DL (ref 0.1–1)
BNP SERPL-MCNC: <10 PG/ML (ref 0–99)
BUN SERPL-MCNC: 11 MG/DL (ref 6–20)
CALCIUM SERPL-MCNC: 9.9 MG/DL (ref 8.7–10.5)
CHLORIDE SERPL-SCNC: 102 MMOL/L (ref 95–110)
CO2 SERPL-SCNC: 27 MMOL/L (ref 23–29)
CREAT SERPL-MCNC: 0.7 MG/DL (ref 0.5–1.4)
DIFFERENTIAL METHOD: ABNORMAL
EOSINOPHIL # BLD AUTO: 0.1 K/UL (ref 0–0.5)
EOSINOPHIL NFR BLD: 1.3 % (ref 0–8)
ERYTHROCYTE [DISTWIDTH] IN BLOOD BY AUTOMATED COUNT: 14.6 % (ref 11.5–14.5)
EST. GFR  (AFRICAN AMERICAN): >60 ML/MIN/1.73 M^2
EST. GFR  (NON AFRICAN AMERICAN): >60 ML/MIN/1.73 M^2
ESTIMATED AVG GLUCOSE: 137 MG/DL (ref 68–131)
GLUCOSE SERPL-MCNC: 90 MG/DL (ref 70–110)
HBA1C MFR BLD HPLC: 6.4 % (ref 4–5.6)
HCT VFR BLD AUTO: 34 % (ref 37–48.5)
HGB BLD-MCNC: 10.5 G/DL (ref 12–16)
IMM GRANULOCYTES # BLD AUTO: 0.04 K/UL (ref 0–0.04)
IMM GRANULOCYTES NFR BLD AUTO: 0.4 % (ref 0–0.5)
LYMPHOCYTES # BLD AUTO: 2.8 K/UL (ref 1–4.8)
LYMPHOCYTES NFR BLD: 28.9 % (ref 18–48)
MCH RBC QN AUTO: 25.5 PG (ref 27–31)
MCHC RBC AUTO-ENTMCNC: 30.9 G/DL (ref 32–36)
MCV RBC AUTO: 83 FL (ref 82–98)
MONOCYTES # BLD AUTO: 0.5 K/UL (ref 0.3–1)
MONOCYTES NFR BLD: 5.4 % (ref 4–15)
NEUTROPHILS # BLD AUTO: 6.2 K/UL (ref 1.8–7.7)
NEUTROPHILS NFR BLD: 63.7 % (ref 38–73)
NRBC BLD-RTO: 0 /100 WBC
PLATELET # BLD AUTO: 356 K/UL (ref 150–350)
PMV BLD AUTO: 9.3 FL (ref 9.2–12.9)
POCT GLUCOSE: 107 MG/DL (ref 70–110)
POTASSIUM SERPL-SCNC: 3.4 MMOL/L (ref 3.5–5.1)
PROT SERPL-MCNC: 7.6 G/DL (ref 6–8.4)
RBC # BLD AUTO: 4.11 M/UL (ref 4–5.4)
SODIUM SERPL-SCNC: 141 MMOL/L (ref 136–145)
TROPONIN I SERPL DL<=0.01 NG/ML-MCNC: <0.006 NG/ML (ref 0–0.03)
WBC # BLD AUTO: 9.68 K/UL (ref 3.9–12.7)

## 2020-02-23 PROCEDURE — G0378 HOSPITAL OBSERVATION PER HR: HCPCS

## 2020-02-23 PROCEDURE — 83880 ASSAY OF NATRIURETIC PEPTIDE: CPT

## 2020-02-23 PROCEDURE — 36415 COLL VENOUS BLD VENIPUNCTURE: CPT

## 2020-02-23 PROCEDURE — 84484 ASSAY OF TROPONIN QUANT: CPT

## 2020-02-23 PROCEDURE — 25000003 PHARM REV CODE 250: Performed by: HOSPITALIST

## 2020-02-23 PROCEDURE — 25000003 PHARM REV CODE 250: Performed by: NURSE PRACTITIONER

## 2020-02-23 PROCEDURE — 80053 COMPREHEN METABOLIC PANEL: CPT

## 2020-02-23 PROCEDURE — 99220 PR INITIAL OBSERVATION CARE,LEVL III: ICD-10-PCS | Mod: ,,, | Performed by: INTERNAL MEDICINE

## 2020-02-23 PROCEDURE — 94761 N-INVAS EAR/PLS OXIMETRY MLT: CPT

## 2020-02-23 PROCEDURE — 85025 COMPLETE CBC W/AUTO DIFF WBC: CPT

## 2020-02-23 PROCEDURE — 99220 PR INITIAL OBSERVATION CARE,LEVL III: CPT | Mod: ,,, | Performed by: INTERNAL MEDICINE

## 2020-02-23 RX ORDER — TRAMADOL HYDROCHLORIDE 50 MG/1
50 TABLET ORAL EVERY 6 HOURS PRN
Status: DISCONTINUED | OUTPATIENT
Start: 2020-02-23 | End: 2020-02-23 | Stop reason: HOSPADM

## 2020-02-23 RX ORDER — TRAMADOL HYDROCHLORIDE 50 MG/1
50 TABLET ORAL EVERY 6 HOURS PRN
COMMUNITY

## 2020-02-23 RX ORDER — POTASSIUM CHLORIDE 20 MEQ/1
40 TABLET, EXTENDED RELEASE ORAL ONCE
Status: COMPLETED | OUTPATIENT
Start: 2020-02-23 | End: 2020-02-23

## 2020-02-23 RX ORDER — ASPIRIN 81 MG/1
81 TABLET ORAL DAILY
Refills: 0
Start: 2020-02-23 | End: 2021-02-22

## 2020-02-23 RX ADMIN — HYDROCHLOROTHIAZIDE 25 MG: 25 TABLET ORAL at 10:02

## 2020-02-23 RX ADMIN — AMLODIPINE BESYLATE 5 MG: 5 TABLET ORAL at 10:02

## 2020-02-23 RX ADMIN — GABAPENTIN 300 MG: 300 CAPSULE ORAL at 10:02

## 2020-02-23 RX ADMIN — LEVOTHYROXINE SODIUM 125 MCG: 125 TABLET ORAL at 05:02

## 2020-02-23 RX ADMIN — METOPROLOL SUCCINATE 25 MG: 25 TABLET, FILM COATED, EXTENDED RELEASE ORAL at 10:02

## 2020-02-23 RX ADMIN — TRAMADOL HYDROCHLORIDE 50 MG: 50 TABLET ORAL at 06:02

## 2020-02-23 RX ADMIN — TRAMADOL HYDROCHLORIDE 50 MG: 50 TABLET ORAL at 12:02

## 2020-02-23 RX ADMIN — ACETAMINOPHEN 650 MG: 325 TABLET ORAL at 11:02

## 2020-02-23 RX ADMIN — ACETAMINOPHEN 650 MG: 325 TABLET ORAL at 05:02

## 2020-02-23 RX ADMIN — PANTOPRAZOLE SODIUM 40 MG: 40 TABLET, DELAYED RELEASE ORAL at 10:02

## 2020-02-23 RX ADMIN — POTASSIUM CHLORIDE 40 MEQ: 1500 TABLET, EXTENDED RELEASE ORAL at 10:02

## 2020-02-23 RX ADMIN — ATORVASTATIN CALCIUM 20 MG: 10 TABLET, FILM COATED ORAL at 10:02

## 2020-02-23 RX ADMIN — ASPIRIN 81 MG: 81 TABLET, COATED ORAL at 10:02

## 2020-02-23 NOTE — HOSPITAL COURSE
Mrs. Nguyen is a 60 yo female who was placed in observation for chest pain. ACS ruled out. No occurrence of chest pain during observation stay. NST not able to find. Recent echo at  normal EF and diastolic dysfunction. Patient wants to go home. Patient stable for discharge home with close follow up primary cardiologist.

## 2020-02-23 NOTE — SUBJECTIVE & OBJECTIVE
"Past Medical History:   Diagnosis Date    Cancer     thyroid    Diabetes mellitus     Hypertension        Past Surgical History:   Procedure Laterality Date     SECTION      CHOLECYSTECTOMY      HYSTERECTOMY      TONSILLECTOMY         Review of patient's allergies indicates:   Allergen Reactions    Clindamycin Shortness Of Breath     And face swelling    Lisinopril Anaphylaxis    Promethazine Other (See Comments)     "makes me crazy"       No current facility-administered medications on file prior to encounter.      Current Outpatient Medications on File Prior to Encounter   Medication Sig    amLODIPine (NORVASC) 5 MG tablet Take 5 mg by mouth once daily.    atorvastatin (LIPITOR) 20 MG tablet Take 20 mg by mouth once daily.    famotidine (PEPCID) 20 MG tablet Take 20 mg by mouth 2 (two) times daily.    gabapentin (NEURONTIN) 300 MG capsule Take 300 mg by mouth 3 (three) times daily.    glimepiride (AMARYL) 2 MG tablet Take 2 mg by mouth before breakfast.    hydroCHLOROthiazide (HYDRODIURIL) 25 MG tablet Take 25 mg by mouth once daily.    hydrocortisone 1 % cream Apply topically as needed.    insulin glargine (LANTUS) 100 unit/mL injection Inject 30 Units into the skin once daily.    ipratropium (ATROVENT) 42 mcg (0.06 %) nasal spray 2 sprays by Nasal route 2 (two) times daily as needed for Rhinitis.    levothyroxine (SYNTHROID) 125 MCG tablet Take 125 mcg by mouth before breakfast.    metFORMIN (GLUCOPHAGE) 500 MG tablet Take 500 mg by mouth 2 (two) times daily with meals.    metoprolol succinate (TOPROL-XL) 25 MG 24 hr tablet Take 25 mg by mouth once daily.    tiZANidine (ZANAFLEX) 4 MG tablet Take 4 mg by mouth every 6 (six) hours as needed.    traMADol (ULTRAM) 50 mg tablet Take 50 mg by mouth every 6 (six) hours as needed for Pain.     Family History     None        Tobacco Use    Smoking status: Never Smoker    Smokeless tobacco: Never Used   Substance and Sexual Activity    " Alcohol use: Not Currently     Frequency: Never    Drug use: Not on file    Sexual activity: Not Currently     Review of Systems   Constitution: Negative for decreased appetite.   HENT: Negative for ear discharge.    Eyes: Negative for blurred vision.   Respiratory: Negative for hemoptysis.    Endocrine: Negative for polyphagia.   Hematologic/Lymphatic: Negative for adenopathy.   Skin: Negative for color change.   Musculoskeletal: Negative for joint swelling.   Genitourinary: Negative for bladder incontinence.   Neurological: Negative for brief paralysis.   Psychiatric/Behavioral: Negative for hallucinations.   Allergic/Immunologic: Negative for hives.     Objective:     Vital Signs (Most Recent):  Temp: 97.9 °F (36.6 °C) (02/23/20 0837)  Pulse: 86 (02/23/20 0837)  Resp: 18 (02/23/20 0837)  BP: (!) 166/85 (02/23/20 0837)  SpO2: 97 % (02/23/20 0837) Vital Signs (24h Range):  Temp:  [97.9 °F (36.6 °C)-99.5 °F (37.5 °C)] 97.9 °F (36.6 °C)  Pulse:  [] 86  Resp:  [17-24] 18  SpO2:  [94 %-100 %] 97 %  BP: (129-189)/() 166/85     Weight: 105.2 kg (232 lb)  Body mass index is 41.1 kg/m².    SpO2: 97 %  O2 Device (Oxygen Therapy): room air      Intake/Output Summary (Last 24 hours) at 2/23/2020 0952  Last data filed at 2/23/2020 0500  Gross per 24 hour   Intake 1480 ml   Output --   Net 1480 ml       Lines/Drains/Airways     Peripheral Intravenous Line                 Peripheral IV - Single Lumen 02/22/20 1758 18 G Right Antecubital less than 1 day                Physical Exam   Constitutional: She is oriented to person, place, and time. She appears well-developed and well-nourished.   HENT:   Head: Normocephalic and atraumatic.   Eyes: Pupils are equal, round, and reactive to light. Conjunctivae are normal.   Neck: Normal range of motion. Neck supple.   Cardiovascular: Normal rate, normal heart sounds and intact distal pulses.   Pulmonary/Chest: Effort normal and breath sounds normal.   Abdominal: Soft. Bowel  sounds are normal.   Musculoskeletal: Normal range of motion.   Neurological: She is alert and oriented to person, place, and time.   Skin: Skin is warm and dry.       Significant Labs: All pertinent lab results from the last 24 hours have been reviewed.    Significant Imaging: Echocardiogram: 2D echo with color flow doppler: No results found for this or any previous visit.

## 2020-02-23 NOTE — ASSESSMENT & PLAN NOTE
HgbA1c pending  Hold oral antihyperglycemics while inpatient  PRN sliding scale insulin  ACHS glucose monitoring   ADA diet

## 2020-02-23 NOTE — NURSING
Pt IV and telemetry removed. Pt received D/C instructions and verbalized understanding and waiting for transport

## 2020-02-23 NOTE — DISCHARGE SUMMARY
[facility] Hospital Medicine  Discharge Summary      Patient Name: Chad Nguyen  MRN: [id number]  Admission Date: 2/22/2020  Hospital Length of Stay: 0 days  Discharge Date and Time:  02/23/2020 10:02 AM  Attending Physician: Sanjuanita Alcaraz MD   Discharging Provider: Cassandra Mccauley NP  Primary Care Provider: Haydee Mccauley MD      HPI:   59 y.o. female with hypertension, hyperlipidemia dm 2, and morbid obesity presents with a complaint of shortness of breath that has been intermittent for the past week.  Associated with midsternal chest pain that radiates to the left chest that began acutely earlier today.  Attempted self treatment with home inhaler without relief.  Also complains of left lower leg pain. Denies fever, chills, cough, palpitations, orthopnea, PND, dizziness, syncope, nausea, vomiting, diarrhea, abdominal pain, bloody or black stool, dysuria, frequency, urgency.  Recent laminectomy 01/22/2020.  Saw cardiologist Dr. Sosa in early January 2020 for preop clearance.  EKG at that time was borderline abnormal with nonspecific ST-T abnormalities.  Underwent echocardiogram and nuclear stress test 01/13/2020.  She has not yet followed up to receive her results.  In the ED today, EKG without evidence of acute ischemia and appears similar to the description and Dr. Sosa's note.  Initial troponin negative.  Routine labs, urinalysis, and chest x-ray also unremarkable for any acute abnormality.  CTA chest is pending to rule out PE.  Placed in observation for ACS rule out.    * No surgery found *      Hospital Course:   Mrs. Nguyen is a 58 yo female who was placed in observation for chest pain. ACS ruled out. No occurrence of chest pain during observation stay. NST not able to find. Recent echo at  normal EF and diastolic dysfunction. Patient wants to go home. Patient stable for discharge home with close follow up primary cardiologist.      Consults:   Consults (From  admission, onward)        Status Ordering Provider     Inpatient consult to Cardiology  Once     Provider:  Heath Bernardo MD    Acknowledged ARRON NATH          No new Assessment & Plan notes have been filed under this hospital service since the last note was generated.  Service: Hospital Medicine    Final Active Diagnoses:    Diagnosis Date Noted POA    PRINCIPAL PROBLEM:  Chest pain [R07.9] 02/22/2020 Yes    Essential hypertension [I10] 02/22/2020 Yes     Chronic    Type 2 diabetes mellitus, with long-term current use of insulin [E11.9, Z79.4] 02/22/2020 Not Applicable     Chronic    Mixed hyperlipidemia [E78.2] 02/22/2020 Yes     Chronic      Problems Resolved During this Admission:       Discharged Condition: good    Disposition: Home or Self Care    Follow Up:  Follow-up Information     Abad Borjas MD. Schedule an appointment as soon as possible for a visit in 1 day.    Specialty:  Cardiology  Contact information:  [address] [place] [address] [address]  [id number]  CARDIOLOGY CENTER  [facility] [id number] [phone]                 Patient Instructions:      Diet diabetic     Diet Cardiac     Notify your health care provider if you experience any of the following:  temperature >100.4     Notify your health care provider if you experience any of the following:  difficulty breathing or increased cough     Notify your health care provider if you experience any of the following:  increased confusion or weakness     Activity as tolerated       Significant Diagnostic Studies: Labs: All labs within the past 24 hours have been reviewed    Pending Diagnostic Studies:     Procedure Component Value Units Date/Time    Hemoglobin A1c [[id number]] Collected:  02/22/20 2020    Order Status:  Sent Lab Status:  In process Updated:  02/22/20 2020    Specimen:  Blood          Medications:  Reconciled Home Medications:      Medication List      START taking these medications    aspirin 81 MG EC tablet  Commonly known  as:  ECOTRIN  Take 1 tablet (81 mg total) by mouth once daily.        CONTINUE taking these medications    amLODIPine 5 MG tablet  Commonly known as:  NORVASC  Take 5 mg by mouth once daily.     atorvastatin 20 MG tablet  Commonly known as:  LIPITOR  Take 20 mg by mouth once daily.     famotidine 20 MG tablet  Commonly known as:  PEPCID  Take 20 mg by mouth 2 (two) times daily.     gabapentin 300 MG capsule  Commonly known as:  NEURONTIN  Take 300 mg by mouth 3 (three) times daily.     glimepiride 2 MG tablet  Commonly known as:  AMARYL  Take 2 mg by mouth before breakfast.     hydroCHLOROthiazide 25 MG tablet  Commonly known as:  HYDRODIURIL  Take 25 mg by mouth once daily.     hydrocortisone 1 % cream  Apply topically as needed.     insulin glargine 100 unit/mL injection  Commonly known as:  LANTUS  Inject 30 Units into the skin once daily.     ipratropium 42 mcg (0.06 %) nasal spray  Commonly known as:  ATROVENT  2 sprays by Nasal route 2 (two) times daily as needed for Rhinitis.     levothyroxine 125 MCG tablet  Commonly known as:  SYNTHROID  Take 125 mcg by mouth before breakfast.     metFORMIN 500 MG tablet  Commonly known as:  GLUCOPHAGE  Take 500 mg by mouth 2 (two) times daily with meals.     metoprolol succinate 25 MG 24 hr tablet  Commonly known as:  TOPROL-XL  Take 25 mg by mouth once daily.     tiZANidine 4 MG tablet  Commonly known as:  ZANAFLEX  Take 4 mg by mouth every 6 (six) hours as needed.     traMADol 50 mg tablet  Commonly known as:  ULTRAM  Take 50 mg by mouth every 6 (six) hours as needed for Pain.            Indwelling Lines/Drains at time of discharge:   Lines/Drains/Airways     None                 Time spent on the discharge of patient: 30 minutes  Patient was seen and examined on the date of discharge and determined to be suitable for discharge.         Cassandra Mccauley NP  Department of Hospital Medicine  [facility]

## 2020-02-23 NOTE — NURSING TRANSFER
Nursing Transfer Note      2/22/2020     Transfer From: [facility] ED To: 325    Transfer via stretcher    Transfer with cardiac monitoring    Transported by EMS personnel    Medicines sent: none    Chart send with patient: Yes    Notified: spouse at bedside with patient    Patient reassessed at: 2045    Upon arrival to floor patient walked from stretcher to bed with no assist. Cardiac monitoring applied. Vital signs obtained and found in flow sheets with complete patient assessment. Skin dry with a 18g RAC PIV noted saline locked and a scab to middle of back from recent surgery. Plan to trend troponins.monitor and manage pain, and maintain npo status after midnight for possible testing. Patient updated on plan of care and verbalized understanding. Call light in reach and patient instructed to inform the nurse if anything is needed. Patient stable and will continue to be monitored.

## 2020-02-23 NOTE — PROGRESS NOTES
[facility] Hospital Medicine  Progress Note    Patient Name: Chad Nguyen  MRN: [id number]  Patient Class: OP- Observation   Admission Date: 2/22/2020  Length of Stay: 0 days  Attending Physician: Sanjuanita Alcaraz MD  Primary Care Provider: Haydee Mccauley MD        Subjective:     Principal Problem:Chest pain        HPI:  59 y.o. female with hypertension, hyperlipidemia dm 2, and morbid obesity presents with a complaint of shortness of breath that has been intermittent for the past week.  Associated with midsternal chest pain that radiates to the left chest that began acutely earlier today.  Attempted self treatment with home inhaler without relief.  Also complains of left lower leg pain. Denies fever, chills, cough, palpitations, orthopnea, PND, dizziness, syncope, nausea, vomiting, diarrhea, abdominal pain, bloody or black stool, dysuria, frequency, urgency.  Recent laminectomy 01/22/2020.  Saw cardiologist Dr. Sosa in early January 2020 for preop clearance.  EKG at that time was borderline abnormal with nonspecific ST-T abnormalities.  Underwent echocardiogram and nuclear stress test 01/13/2020.  She has not yet followed up to receive her results.  In the ED today, EKG without evidence of acute ischemia and appears similar to the description and Dr. Sosa's note.  Initial troponin negative.  Routine labs, urinalysis, and chest x-ray also unremarkable for any acute abnormality.  CTA chest is pending to rule out PE.  Placed in observation for ACS rule out.    Overview/Hospital Course:  No notes on file    Interval History: Currently denies chest pain or shortness of breath     Review of Systems   Constitutional: Negative for chills, fatigue and fever.   Eyes: Negative for photophobia and visual disturbance.   Respiratory: Positive for shortness of breath. Negative for cough.    Cardiovascular: Positive for chest pain. Negative for palpitations and leg swelling.    Gastrointestinal: Negative for abdominal pain, diarrhea, nausea and vomiting.   Genitourinary: Negative for dysuria, frequency and urgency.   Musculoskeletal: Positive for myalgias.   Skin: Negative for pallor, rash and wound.   Neurological: Negative for light-headedness and headaches.   Psychiatric/Behavioral: Negative for confusion and decreased concentration.     Objective:     Vital Signs (Most Recent):  Temp: 98.1 °F (36.7 °C) (02/23/20 0423)  Pulse: 81 (02/23/20 0423)  Resp: 18 (02/23/20 0423)  BP: 136/66 (02/23/20 0423)  SpO2: (!) 94 % (02/23/20 0423) Vital Signs (24h Range):  Temp:  [98.1 °F (36.7 °C)-99.5 °F (37.5 °C)] 98.1 °F (36.7 °C)  Pulse:  [] 81  Resp:  [17-24] 18  SpO2:  [94 %-100 %] 94 %  BP: (129-189)/() 136/66     Weight: 105.2 kg (232 lb)  Body mass index is 41.1 kg/m².    Intake/Output Summary (Last 24 hours) at 2/23/2020 0815  Last data filed at 2/23/2020 0500  Gross per 24 hour   Intake 1480 ml   Output --   Net 1480 ml      Physical Exam   Constitutional: She is oriented to person, place, and time. She appears well-developed and well-nourished. No distress.   HENT:   Head: Normocephalic and atraumatic.   Eyes: Pupils are equal, round, and reactive to light. Conjunctivae and EOM are normal.   Neck: Normal range of motion. Neck supple.   Cardiovascular: Normal rate, regular rhythm and intact distal pulses.   Pulmonary/Chest: Effort normal and breath sounds normal. No respiratory distress. She has no wheezes.   Abdominal: Soft. Bowel sounds are normal. She exhibits no distension. There is no tenderness.   No palpable hepatomegaly or splenomegaly    Musculoskeletal: Normal range of motion. She exhibits no edema or tenderness.   Neurological: She is alert and oriented to person, place, and time.   Skin: Skin is warm and dry.   Psychiatric: She has a normal mood and affect. Thought content normal.   Nursing note and vitals reviewed.      Significant Labs: All pertinent labs within  "the past 24 hours have been reviewed.    Significant Imaging: I have reviewed and interpreted all pertinent imaging results/findings within the past 24 hours.      Assessment/Plan:      * Chest pain  Concerning characteristics and risk factors, unable to view recent nuclear stress test results in epic.  Currently pain free, EKG without evidence of acute ischemia, initial troponin negative.  -monitor on tele    2/23/20  [facility] 1/20/20 2 D echo showed mild LV cavity size, normal EF, diastolic dysfunction. I cannot find NST on 1/20/20. No history of MI. Mom Dad "heart disease"  Symptoms completely relieved with nitro x 3 and nitro paste in ED. EKG  and repeat EKG NSR. Troponin negative x 2 and normal BNP  CTA no PE, fatty liver, mild bibasilar atelectasis  Presentation very concerning and I cannot find recent NST done at   Heart score 5-suspcious2, age3, risk factors 2  Add lipid profile  ASA,stain,BB. Not on ACE/ARB  Consult Cardiology       Mixed hyperlipidemia  Continue statin    Type 2 diabetes mellitus, with long-term current use of insulin  HgbA1c pending  Hold oral antihyperglycemics while inpatient  PRN sliding scale insulin  ACHS glucose monitoring   ADA diet     Essential hypertension  Well controlled, continue home medications and monitor blood pressure, adjust as needed.     2/23/20  Uncontrolled on admit. /103 improved after nitro  Continue amlodipine, metop, hctz. Hydralazine prn.       VTE Risk Mitigation (From admission, onward)         Ordered     enoxaparin injection 40 mg  Daily      02/22/20 2009     Place sequential compression device  Until discontinued      02/22/20 2009     IP VTE HIGH RISK PATIENT  Once      02/22/20 2006     Place DEZ hose  Until discontinued      02/22/20 2006                      Cassandra Mccauley NP  Department of Hospital Medicine   [facility]    "

## 2020-02-23 NOTE — HPI
59 y.o. female with hypertension, hyperlipidemia dm 2, and morbid obesity presents with a complaint of shortness of breath that has been intermittent for the past week.  Associated with midsternal chest pain that radiates to the left chest that began acutely earlier today.  Attempted self treatment with home inhaler without relief.  Also complains of left lower leg pain. Denies fever, chills, cough, palpitations, orthopnea, PND, dizziness, syncope, nausea, vomiting, diarrhea, abdominal pain, bloody or black stool, dysuria, frequency, urgency.  Recent laminectomy 01/22/2020.  Saw cardiologist Dr. Sosa in early January 2020 for preop clearance.  EKG at that time was borderline abnormal with nonspecific ST-T abnormalities.  Underwent echocardiogram and nuclear stress test 01/13/2020.  She has not yet followed up to receive her results.  In the ED today, EKG without evidence of acute ischemia and appears similar to the description and Dr. Sosa's note.  Initial troponin negative.  Routine labs, urinalysis, and chest x-ray also unremarkable for any acute abnormality.  CTA chest is pending to rule out PE.  Placed in observation for ACS rule out.     Currently CP free  EKG sinus tachycardia 116 LVH  Troponin negative x 2    Followed by Dr Sosa  Patient ID: Chad Nguyen Is a 59 y.o..year old woman here for cardiac evaluation and clearance for weight loss surgery. The patient does have a strong family history of heart disease both her father and brother die of heart disease. He also has found her blood pressure diabetes and hyperlipidemia. The patient's activities are limited cuts the lower back problem. She is also scheduled to have back surgery in the near future. Her EKG is borderline abnormal with low QRS and nonspecific ST segment changes in the septal leads.  She denies tobacco abuse     1. Cardiac clearance; the patient is fairly asymptomatic that showed her activities are also limited because  of a back problem. EKG and is only borderline abnormal. The patient will not be able to walk on the treadmill. Because of this I will proceed with a Lexiscan and echocardiogram. The patient is taking multiple medications including amlodipine 5 mg daily, hydrochlorothiazide 25 mg a day, lisinopril was 40 mg a became allergic to it and stop. The patient does not have asthma. In view of this I would like to add a beta-blocker with Coreg 6.25 b.i.d.    2. Hypertension; blood pressure today is 135/90 with a heart rate of 93.     3. Hyperlipidemia; she is on atorvastatin 20 mg a day last LDL was 92 HDL 67 triglycerides 126 March of 2019. After the patient has a surgery and she loses weight I would like to repeat the lipid profile and consider optimizing her medical therapy    4. History of thyroid cancer status post removal back in 2007    5. Obesity; waiting to have weight loss surgery    6. Diabetes mellitus. On multiple medications. Last hemoglobin A1c was November 2019 7.2    7. Carotid bruits; obtain an ultrasound       Echo [facility] 1/21/20    *Mildly increased left ventricular cavity size. Normal left ventricular wall thickness. Normal left ventricular systolic function. Left      ventricular ejection fraction is estimated at 50-55__ %. Grade I/IV diastolic dysfunction (abnormal relaxation filling pattern), normal      to mildly elevated filling pressures.      *Other findings as noted above     Stress test 1/13/20 [facility] - no result in computer

## 2020-02-23 NOTE — CONSULTS
[facility]  Cardiology  Consult Note    Patient Name: Chad Nguyen  MRN: [id number]  Admission Date: 2/22/2020  Hospital Length of Stay: 0 days  Code Status: Full Code   Attending Provider: Sanjuanita Alcaraz MD   Consulting Provider: Heath Bernardo MD  Primary Care Physician: Haydee Mccauley MD  Principal Problem:Chest pain    Patient information was obtained from patient and ER records.     Consults  Subjective:     Chief Complaint:  CP     HPI:   59 y.o. female with hypertension, hyperlipidemia dm 2, and morbid obesity presents with a complaint of shortness of breath that has been intermittent for the past week.  Associated with midsternal chest pain that radiates to the left chest that began acutely earlier today.  Attempted self treatment with home inhaler without relief.  Also complains of left lower leg pain. Denies fever, chills, cough, palpitations, orthopnea, PND, dizziness, syncope, nausea, vomiting, diarrhea, abdominal pain, bloody or black stool, dysuria, frequency, urgency.  Recent laminectomy 01/22/2020.  Saw cardiologist Dr. Sosa in early January 2020 for preop clearance.  EKG at that time was borderline abnormal with nonspecific ST-T abnormalities.  Underwent echocardiogram and nuclear stress test 01/13/2020.  She has not yet followed up to receive her results.  In the ED today, EKG without evidence of acute ischemia and appears similar to the description and Dr. Sosa's note.  Initial troponin negative.  Routine labs, urinalysis, and chest x-ray also unremarkable for any acute abnormality.  CTA chest is pending to rule out PE.  Placed in observation for ACS rule out.     Currently CP free  EKG sinus tachycardia 116 LVH  Troponin negative x 2    Followed by Dr Sosa  Patient ID: Chad Nguyen Is a 59 y.o..year old woman here for cardiac evaluation and clearance for weight loss surgery. The patient does have a strong family history of heart disease  both her father and brother die of heart disease. He also has found her blood pressure diabetes and hyperlipidemia. The patient's activities are limited cuts the lower back problem. She is also scheduled to have back surgery in the near future. Her EKG is borderline abnormal with low QRS and nonspecific ST segment changes in the septal leads.  She denies tobacco abuse     1. Cardiac clearance; the patient is fairly asymptomatic that showed her activities are also limited because of a back problem. EKG and is only borderline abnormal. The patient will not be able to walk on the treadmill. Because of this I will proceed with a Lexiscan and echocardiogram. The patient is taking multiple medications including amlodipine 5 mg daily, hydrochlorothiazide 25 mg a day, lisinopril was 40 mg a became allergic to it and stop. The patient does not have asthma. In view of this I would like to add a beta-blocker with Coreg 6.25 b.i.d.    2. Hypertension; blood pressure today is 135/90 with a heart rate of 93.     3. Hyperlipidemia; she is on atorvastatin 20 mg a day last LDL was 92 HDL 67 triglycerides 126 March of 2019. After the patient has a surgery and she loses weight I would like to repeat the lipid profile and consider optimizing her medical therapy    4. History of thyroid cancer status post removal back in 2007    5. Obesity; waiting to have weight loss surgery    6. Diabetes mellitus. On multiple medications. Last hemoglobin A1c was November 2019 7.2    7. Carotid bruits; obtain an ultrasound       Echo [facility] 1/21/20    *Mildly increased left ventricular cavity size. Normal left ventricular wall thickness. Normal left ventricular systolic function. Left      ventricular ejection fraction is estimated at 50-55__ %. Grade I/IV diastolic dysfunction (abnormal relaxation filling pattern), normal      to mildly elevated filling pressures.      *Other findings as noted above     Stress test 1/13/20 [facility] - no result in computer  "    Past Medical History:   Diagnosis Date    Cancer     thyroid    Diabetes mellitus     Hypertension        Past Surgical History:   Procedure Laterality Date     SECTION      CHOLECYSTECTOMY      HYSTERECTOMY      TONSILLECTOMY         Review of patient's allergies indicates:   Allergen Reactions    Clindamycin Shortness Of Breath     And face swelling    Lisinopril Anaphylaxis    Promethazine Other (See Comments)     "makes me crazy"       No current facility-administered medications on file prior to encounter.      Current Outpatient Medications on File Prior to Encounter   Medication Sig    amLODIPine (NORVASC) 5 MG tablet Take 5 mg by mouth once daily.    atorvastatin (LIPITOR) 20 MG tablet Take 20 mg by mouth once daily.    famotidine (PEPCID) 20 MG tablet Take 20 mg by mouth 2 (two) times daily.    gabapentin (NEURONTIN) 300 MG capsule Take 300 mg by mouth 3 (three) times daily.    glimepiride (AMARYL) 2 MG tablet Take 2 mg by mouth before breakfast.    hydroCHLOROthiazide (HYDRODIURIL) 25 MG tablet Take 25 mg by mouth once daily.    hydrocortisone 1 % cream Apply topically as needed.    insulin glargine (LANTUS) 100 unit/mL injection Inject 30 Units into the skin once daily.    ipratropium (ATROVENT) 42 mcg (0.06 %) nasal spray 2 sprays by Nasal route 2 (two) times daily as needed for Rhinitis.    levothyroxine (SYNTHROID) 125 MCG tablet Take 125 mcg by mouth before breakfast.    metFORMIN (GLUCOPHAGE) 500 MG tablet Take 500 mg by mouth 2 (two) times daily with meals.    metoprolol succinate (TOPROL-XL) 25 MG 24 hr tablet Take 25 mg by mouth once daily.    tiZANidine (ZANAFLEX) 4 MG tablet Take 4 mg by mouth every 6 (six) hours as needed.    traMADol (ULTRAM) 50 mg tablet Take 50 mg by mouth every 6 (six) hours as needed for Pain.     Family History     None        Tobacco Use    Smoking status: Never Smoker    Smokeless tobacco: Never Used   Substance and Sexual Activity "    Alcohol use: Not Currently     Frequency: Never    Drug use: Not on file    Sexual activity: Not Currently     Review of Systems   Constitution: Negative for decreased appetite.   HENT: Negative for ear discharge.    Eyes: Negative for blurred vision.   Respiratory: Negative for hemoptysis.    Endocrine: Negative for polyphagia.   Hematologic/Lymphatic: Negative for adenopathy.   Skin: Negative for color change.   Musculoskeletal: Negative for joint swelling.   Genitourinary: Negative for bladder incontinence.   Neurological: Negative for brief paralysis.   Psychiatric/Behavioral: Negative for hallucinations.   Allergic/Immunologic: Negative for hives.     Objective:     Vital Signs (Most Recent):  Temp: 97.9 °F (36.6 °C) (02/23/20 0837)  Pulse: 86 (02/23/20 0837)  Resp: 18 (02/23/20 0837)  BP: (!) 166/85 (02/23/20 0837)  SpO2: 97 % (02/23/20 0837) Vital Signs (24h Range):  Temp:  [97.9 °F (36.6 °C)-99.5 °F (37.5 °C)] 97.9 °F (36.6 °C)  Pulse:  [] 86  Resp:  [17-24] 18  SpO2:  [94 %-100 %] 97 %  BP: (129-189)/() 166/85     Weight: 105.2 kg (232 lb)  Body mass index is 41.1 kg/m².    SpO2: 97 %  O2 Device (Oxygen Therapy): room air      Intake/Output Summary (Last 24 hours) at 2/23/2020 0952  Last data filed at 2/23/2020 0500  Gross per 24 hour   Intake 1480 ml   Output --   Net 1480 ml       Lines/Drains/Airways     Peripheral Intravenous Line                 Peripheral IV - Single Lumen 02/22/20 1758 18 G Right Antecubital less than 1 day                Physical Exam   Constitutional: She is oriented to person, place, and time. She appears well-developed and well-nourished.   HENT:   Head: Normocephalic and atraumatic.   Eyes: Pupils are equal, round, and reactive to light. Conjunctivae are normal.   Neck: Normal range of motion. Neck supple.   Cardiovascular: Normal rate, normal heart sounds and intact distal pulses.   Pulmonary/Chest: Effort normal and breath sounds normal.   Abdominal: Soft.  Bowel sounds are normal.   Musculoskeletal: Normal range of motion.   Neurological: She is alert and oriented to person, place, and time.   Skin: Skin is warm and dry.       Significant Labs: All pertinent lab results from the last 24 hours have been reviewed.    Significant Imaging: Echocardiogram: 2D echo with color flow doppler: No results found for this or any previous visit.    Assessment and Plan:     * Chest pain  Ruled out for MI. Had recent stress test at  - no result available in care everywhere. [facility] echo with EF 50-55%. Ok for d/c and f/u with regular cardiologist next week for stress results    Mixed hyperlipidemia  On statin    Type 2 diabetes mellitus, with long-term current use of insulin  Per primary    Essential hypertension  stable        VTE Risk Mitigation (From admission, onward)         Ordered     enoxaparin injection 40 mg  Daily      02/22/20 2009     Place sequential compression device  Until discontinued      02/22/20 2009     IP VTE HIGH RISK PATIENT  Once      02/22/20 2006     Place DEZ hose  Until discontinued      02/22/20 2006                Thank you for your consult. I will sign off. Please contact us if you have any additional questions.    Heath Bernardo MD  Cardiology   [facility]

## 2020-02-23 NOTE — H&P
[facility] Hospital Medicine  History & Physical    Patient Name: Chad Nguyen  MRN: [id number]  Admission Date: 2/22/2020  Attending Physician: Sanjuanita Alcaraz MD   Primary Care Provider: Haydee Mccauley MD         Patient information was obtained from patient, past medical records and ER records.     Subjective:     Principal Problem:Chest pain    Chief Complaint:   Chief Complaint   Patient presents with    Shortness of Breath     INTERMITTENT SOB X 1 WEEK WORSENING WITHIN THE LAST HOUR; STABBING MIDSTERNAL CHEST PAIN RADIATING TO LEFT CHEST AT A 6/10 PAIN;         HPI: 59 y.o. female with hypertension, hyperlipidemia dm 2, and morbid obesity presents with a complaint of shortness of breath that has been intermittent for the past week.  Associated with midsternal chest pain that radiates to the left chest that began acutely earlier today.  Attempted self treatment with home inhaler without relief.  Also complains of left lower leg pain. Denies fever, chills, cough, palpitations, orthopnea, PND, dizziness, syncope, nausea, vomiting, diarrhea, abdominal pain, bloody or black stool, dysuria, frequency, urgency.  Recent laminectomy 01/22/2020.  Saw cardiologist Dr. Sosa in early January 2020 for preop clearance.  EKG at that time was borderline abnormal with nonspecific ST-T abnormalities.  Underwent echocardiogram and nuclear stress test 01/13/2020.  She has not yet followed up to receive her results.  In the ED today, EKG without evidence of acute ischemia and appears similar to the description and Dr. Sosa's note.  Initial troponin negative.  Routine labs, urinalysis, and chest x-ray also unremarkable for any acute abnormality.  CTA chest is pending to rule out PE.  Placed in observation for ACS rule out.    Past Medical History:   Diagnosis Date    Cancer     thyroid    Diabetes mellitus     Hypertension        Past Surgical History:   Procedure Laterality Date     " SECTION      CHOLECYSTECTOMY      HYSTERECTOMY      TONSILLECTOMY         Review of patient's allergies indicates:   Allergen Reactions    Clindamycin Shortness Of Breath     And face swelling    Lisinopril Anaphylaxis    Promethazine Other (See Comments)     "makes me crazy"       No current facility-administered medications on file prior to encounter.      Current Outpatient Medications on File Prior to Encounter   Medication Sig    hydrocortisone-pramoxine (PROCTOFOAM-HS) rectal foam Place 1 applicator rectally 2 (two) times daily.     Family History     None        Tobacco Use    Smoking status: Never Smoker    Smokeless tobacco: Never Used   Substance and Sexual Activity    Alcohol use: Not on file    Drug use: Not on file    Sexual activity: Not on file     Review of Systems   Constitutional: Negative for chills, fatigue and fever.   Eyes: Negative for photophobia and visual disturbance.   Respiratory: Positive for shortness of breath. Negative for cough.    Cardiovascular: Positive for chest pain. Negative for palpitations and leg swelling.   Gastrointestinal: Negative for abdominal pain, diarrhea, nausea and vomiting.   Genitourinary: Negative for dysuria, frequency and urgency.   Musculoskeletal: Positive for myalgias.   Skin: Negative for pallor, rash and wound.   Neurological: Negative for light-headedness and headaches.   Psychiatric/Behavioral: Negative for confusion and decreased concentration.     Objective:     Vital Signs (Most Recent):  Temp: 99.4 °F (37.4 °C) (20 1547)  Pulse: 97 (20 1702)  Resp: 20 (20 170)  BP: (!) 152/68 (20 170)  SpO2: 97 % (20 170) Vital Signs (24h Range):  Temp:  [99.4 °F (37.4 °C)] 99.4 °F (37.4 °C)  Pulse:  [] 97  Resp:  [20-24] 20  SpO2:  [95 %-100 %] 97 %  BP: (139-189)/() 152/68     Weight: 105.2 kg (232 lb)  Body mass index is 41.1 kg/m².    Physical Exam   Constitutional: She is oriented to person, " place, and time. She appears well-developed and well-nourished. No distress.   HENT:   Head: Normocephalic and atraumatic.   Right Ear: External ear normal.   Left Ear: External ear normal.   Nose: Nose normal.   Mouth/Throat: Oropharynx is clear and moist.   Eyes: Pupils are equal, round, and reactive to light. Conjunctivae and EOM are normal.   Neck: Normal range of motion. Neck supple.   Cardiovascular: Normal rate, regular rhythm and intact distal pulses.   Pulmonary/Chest: Effort normal and breath sounds normal. No respiratory distress. She has no wheezes.   Abdominal: Soft. Bowel sounds are normal. She exhibits no distension. There is no tenderness.   No palpable hepatomegaly or splenomegaly    Musculoskeletal: Normal range of motion. She exhibits no edema or tenderness.   Neurological: She is alert and oriented to person, place, and time.   Skin: Skin is warm and dry.   Psychiatric: She has a normal mood and affect. Thought content normal.   Nursing note and vitals reviewed.        CRANIAL NERVES     CN III, IV, VI   Pupils are equal, round, and reactive to light.  Extraocular motions are normal.        Significant Labs: All pertinent labs within the past 24 hours have been reviewed.    Significant Imaging: I have reviewed all pertinent imaging results/findings within the past 24 hours.    Assessment/Plan:     * Chest pain  Concerning characteristics and risk factors, unable to view recent nuclear stress test results in epic.  Currently pain free, EKG without evidence of acute ischemia, initial troponin negative.  -monitor on tele  -obtain serial markers    Mixed hyperlipidemia  Continue statin    Type 2 diabetes mellitus, with long-term current use of insulin  Check a1c  Hold oral antihyperglycemics while inpatient  PRN sliding scale insulin  ACHS glucose monitoring   ADA diet     Essential hypertension  Well controlled, continue home medications and monitor blood pressure, adjust as needed.       VTE Risk  Mitigation (From admission, onward)         Ordered     enoxaparin injection 40 mg  Daily      02/22/20 2009     Place sequential compression device  Until discontinued      02/22/20 2009     IP VTE HIGH RISK PATIENT  Once      02/22/20 2006     Place DEZ hose  Until discontinued      02/22/20 2006              Robel Siddiqi Jr., APRN, [facility]  Hospitalist - Department of Hospital Medicine  [facility] [address] [place] [address]  Office #: [phone]; Pager #: [phone]

## 2020-02-23 NOTE — PROGRESS NOTES
[facility] CASE MANAGEMENT                  WRITTEN DISCHARGE INFORMATION      APPOINTMENTS AND RESOURCES TO HELP YOU MANAGE YOUR CARE AT HOME BASED ON YOUR PREFERENCES:  (If an appointment is not scheduled for you when you leave the hospital, call your doctor to schedule a follow up visit within a week)    Follow-up Information     Abad Borjas MD. Schedule an appointment as soon as possible for a visit in 1 day.    Specialty:  Cardiology  Why:  Please call to schedule your Cardiology appt next week for stress test results   Contact information:  [address] [place], [place] [address]  [id number]  [facility] [id number] [phone]                     Healthy Living Instructions to HELP MANAGE YOUR CARE AT HOME:  Things You are responsible for:  1.    Getting your prescriptions filled   2.    Taking your medications as directed, DO NOT MISS ANY DOSES!  3.    Following the diet and exercise recommended by your doctor  4.    Going to your follow-up doctor appointment. This is important because it allows the doctor to monitor your progress and determine if any changes need to made to your treatment plan.  5. If you have any questions about MANAGING YOUR CARE AT HOME Call the Nurse Care Line for 24/7 Assistance [phone]       Please answer any calls you may receive from [facility]. We want to continue to support you as you manage your healthcare needs. [facility] is happy to have the opportunity to serve you.      Thank you for choosing [facility] for your healthcare needs!  Your [facility] Case Management Team,

## 2020-02-23 NOTE — ASSESSMENT & PLAN NOTE
"Concerning characteristics and risk factors, unable to view recent nuclear stress test results in epic.  Currently pain free, EKG without evidence of acute ischemia, initial troponin negative.  -monitor on tele    2/23/20  [facility] 1/20/20 2 D echo showed mild LV cavity size, normal EF, diastolic dysfunction. I cannot find NST on 1/20/20. No history of MI. Mom Dad "heart disease"  Symptoms completely relieved with nitro x 3 and nitro paste in ED. EKG  and repeat EKG NSR. Troponin negative x 2 and normal BNP  CTA no PE, fatty liver, mild bibasilar atelectasis  Presentation very concerning and I cannot find recent NST done at   Heart score 5-suspcious2, age1, risk factors 2  Add lipid profile  ASA,stain,BB. Not on ACE/ARB  Consult Cardiology     "

## 2020-02-23 NOTE — ASSESSMENT & PLAN NOTE
Check a1c  Hold oral antihyperglycemics while inpatient  PRN sliding scale insulin  ACHS glucose monitoring   ADA diet

## 2020-02-23 NOTE — NURSING
Patient with complaints of back pain due to recent back surgery. Per patient she takes tramadol 50 mg every 6 hours. Dr. Banegas notified and new orders placed placed. Will administer and monitor for effectiveness.

## 2020-02-23 NOTE — ASSESSMENT & PLAN NOTE
Concerning characteristics and risk factors, unable to view recent nuclear stress test results in epic.  Currently pain free, EKG without evidence of acute ischemia, initial troponin negative.  -monitor on tele  -obtain serial markers

## 2020-02-23 NOTE — PLAN OF CARE
Patient discharged prior to providing discharge education. Discharge information printed on AVS.       02/23/20 1244   Final Note   Assessment Type Final Discharge Note   Anticipated Discharge Disposition Home   What phone number can be called within the next 1-3 days to see how you are doing after discharge?   ([phone])   Right Care Referral Info   Post Acute Recommendation No Care

## 2020-02-23 NOTE — ASSESSMENT & PLAN NOTE
Ruled out for MI. Had recent stress test at  - no result available in care everywhere. [facility] echo with EF 50-55%. Ok for d/c and f/u with regular cardiologist next week for stress results

## 2020-02-23 NOTE — PLAN OF CARE
Problem: Adult Inpatient Plan of Care  Goal: Plan of Care Review  Flowsheets (Taken 2/23/2020 0556)  Plan of Care Reviewed With: patient  Goal: Absence of Hospital-Acquired Illness or Injury  Intervention: Identify and Manage Fall Risk  Flowsheets (Taken 2/23/2020 0556)  Safety Promotion/Fall Prevention: bed alarm set; medications reviewed; lighting adjusted; nonskid shoes/socks when out of bed; side rails raised x 2; toileting scheduled; instructed to call staff for mobility  Intervention: Prevent VTE (venous thromboembolism)  Flowsheets (Taken 2/23/2020 0556)  VTE Prevention/Management: ROM (active) performed  Goal: Optimal Comfort and Wellbeing  Intervention: Provide Person-Centered Care  Flowsheets (Taken 2/23/2020 0556)  Trust Relationship/Rapport: care explained; questions answered; questions encouraged; thoughts/feelings acknowledged     Problem: Diabetes Comorbidity  Goal: Blood Glucose Level Within Desired Range  Intervention: Maintain Glycemic Control  Flowsheets (Taken 2/23/2020 0556)  Glycemic Management: supplemental insulin given     Problem: Fall Injury Risk  Goal: Absence of Fall and Fall-Related Injury  Intervention: Identify and Manage Contributors to Fall Injury Risk  Flowsheets (Taken 2/23/2020 0556)  Self-Care Promotion: independence encouraged  Medication Review/Management: medications reviewed  Intervention: Promote Injury-Free Environment  Flowsheets (Taken 2/23/2020 0556)  Safety Promotion/Fall Prevention: bed alarm set; medications reviewed; lighting adjusted; nonskid shoes/socks when out of bed; side rails raised x 2; toileting scheduled; instructed to call staff for mobility  Environmental Safety Modification: clutter free environment maintained; lighting adjusted; room organization consistent; room near unit station     Problem: Breathing Pattern Ineffective  Goal: Effective Breathing Pattern  Intervention: Promote Improved Breathing Pattern  Flowsheets (Taken 2/23/2020  7256)  Supportive Measures: --     Problem: Pain Acute  Goal: Optimal Pain Control  Intervention: Develop Pain Management Plan  Flowsheets (Taken 2/23/2020 0556)  Pain Management Interventions: care clustered  Intervention: Prevent or Manage Pain  Flowsheets (Taken 2/23/2020 0556)  Sensory Stimulation Regulation: music/television provided for relaxation  Intervention: Optimize Psychosocial Wellbeing  Flowsheets (Taken 2/23/2020 0556)  Supportive Measures: verbalization of feelings encouraged

## 2020-02-23 NOTE — NURSING
Received report from OPAL Curtis RN. Pt AAOx4, RR even and unlabored, PERRL with no c/o pain. Telemetry monitor in place. Saline lock in place to site is clear. Pt  Informed of care informed of plan of care and safety maintained with bed low side rails up x2 with nurse call bell within reach

## 2020-02-23 NOTE — SUBJECTIVE & OBJECTIVE
Interval History: Currently denies chest pain or shortness of breath     Review of Systems   Constitutional: Negative for chills, fatigue and fever.   Eyes: Negative for photophobia and visual disturbance.   Respiratory: Positive for shortness of breath. Negative for cough.    Cardiovascular: Positive for chest pain. Negative for palpitations and leg swelling.   Gastrointestinal: Negative for abdominal pain, diarrhea, nausea and vomiting.   Genitourinary: Negative for dysuria, frequency and urgency.   Musculoskeletal: Positive for myalgias.   Skin: Negative for pallor, rash and wound.   Neurological: Negative for light-headedness and headaches.   Psychiatric/Behavioral: Negative for confusion and decreased concentration.     Objective:     Vital Signs (Most Recent):  Temp: 98.1 °F (36.7 °C) (02/23/20 0423)  Pulse: 81 (02/23/20 0423)  Resp: 18 (02/23/20 0423)  BP: 136/66 (02/23/20 0423)  SpO2: (!) 94 % (02/23/20 0423) Vital Signs (24h Range):  Temp:  [98.1 °F (36.7 °C)-99.5 °F (37.5 °C)] 98.1 °F (36.7 °C)  Pulse:  [] 81  Resp:  [17-24] 18  SpO2:  [94 %-100 %] 94 %  BP: (129-189)/() 136/66     Weight: 105.2 kg (232 lb)  Body mass index is 41.1 kg/m².    Intake/Output Summary (Last 24 hours) at 2/23/2020 0815  Last data filed at 2/23/2020 0500  Gross per 24 hour   Intake 1480 ml   Output --   Net 1480 ml      Physical Exam   Constitutional: She is oriented to person, place, and time. She appears well-developed and well-nourished. No distress.   HENT:   Head: Normocephalic and atraumatic.   Eyes: Pupils are equal, round, and reactive to light. Conjunctivae and EOM are normal.   Neck: Normal range of motion. Neck supple.   Cardiovascular: Normal rate, regular rhythm and intact distal pulses.   Pulmonary/Chest: Effort normal and breath sounds normal. No respiratory distress. She has no wheezes.   Abdominal: Soft. Bowel sounds are normal. She exhibits no distension. There is no tenderness.   No palpable  hepatomegaly or splenomegaly    Musculoskeletal: Normal range of motion. She exhibits no edema or tenderness.   Neurological: She is alert and oriented to person, place, and time.   Skin: Skin is warm and dry.   Psychiatric: She has a normal mood and affect. Thought content normal.   Nursing note and vitals reviewed.      Significant Labs: All pertinent labs within the past 24 hours have been reviewed.    Significant Imaging: I have reviewed and interpreted all pertinent imaging results/findings within the past 24 hours.

## 2020-03-29 NOTE — PLAN OF CARE
02/23/20 1243   Discharge Assessment   Assessment Type Discharge Planning Assessment  (Patient discharged prior to completing dc assessment.)      Admit Date: 2/16/2020 12:43 PM   Discharge Date: 2/19/2020    Patient Active Problem List   Diagnosis    Diabetes mellitus ([facility] [address] [facility]    Hypertension    Weakness    Gait disturbance    Gangrene of left foot (HCC)    [facility]ros[facility] of native [facility] left lower [facility]        Present on Admission:   (Resolved) Influenza A        [facility] Medication Instructions [id number]    Printed on:03/29/20 6258   Medication Information                      acetaminophen (TYLENOL) 325 MG tablet  Take 650 mg by mouth every 4 hours as needed for Pain or Fever              amLODIPine (NORVASC) 5 MG tablet  Take 1 tablet by mouth daily             aspirin 81 MG chewable tablet  Take 81 mg by mouth daily             glimepiride (AMARYL) 4 MG tablet  Take 4 mg by mouth 2 times daily              naproxen (NAPROSYN) 250 MG tablet  Take 1 tablet by mouth 2 times daily (with meals)             senna (SENOKOT) 8.6 MG tablet  Take 1 tablet by mouth nightly                   Hospital Course/Procedures:82year-old just discharged on 2/11/2020 due to gangrene of the LEFT foot return to the emergency room with increasing cough shortness of breath and falls. She was positive for influenza A. Patient was admitted 2/16/2020. She was placed on Tamiflu albuterol aerosols and IV fluids with improvement of her symptoms. She did complain of LEFT hip pain. X-rays were negative. CT was negative for fracture. Patient was stable for discharged to skilled nursing facility on 2/19/2020.     Consultants Following:none    Disposition:skilled nursing facility    Follow-up:she'll be followed by the facility physician      Sonu Zarco  3/29/2020  10:58 AM